# Patient Record
Sex: FEMALE | Race: WHITE | NOT HISPANIC OR LATINO
[De-identification: names, ages, dates, MRNs, and addresses within clinical notes are randomized per-mention and may not be internally consistent; named-entity substitution may affect disease eponyms.]

---

## 2017-01-05 ENCOUNTER — APPOINTMENT (OUTPATIENT)
Dept: MAMMOGRAPHY | Facility: IMAGING CENTER | Age: 57
End: 2017-01-05

## 2017-01-05 ENCOUNTER — APPOINTMENT (OUTPATIENT)
Dept: ULTRASOUND IMAGING | Facility: IMAGING CENTER | Age: 57
End: 2017-01-05

## 2017-01-05 ENCOUNTER — OUTPATIENT (OUTPATIENT)
Dept: OUTPATIENT SERVICES | Facility: HOSPITAL | Age: 57
LOS: 1 days | End: 2017-01-05
Payer: COMMERCIAL

## 2017-01-05 DIAGNOSIS — N62 HYPERTROPHY OF BREAST: ICD-10-CM

## 2017-01-05 DIAGNOSIS — Z80.3 FAMILY HISTORY OF MALIGNANT NEOPLASM OF BREAST: ICD-10-CM

## 2017-01-05 DIAGNOSIS — Z98.89 OTHER SPECIFIED POSTPROCEDURAL STATES: Chronic | ICD-10-CM

## 2017-01-05 DIAGNOSIS — Z85.42 PERSONAL HISTORY OF MALIGNANT NEOPLASM OF OTHER PARTS OF UTERUS: ICD-10-CM

## 2017-01-05 PROCEDURE — 76641 ULTRASOUND BREAST COMPLETE: CPT

## 2017-01-05 PROCEDURE — G0279: CPT

## 2017-01-05 PROCEDURE — 77066 DX MAMMO INCL CAD BI: CPT

## 2017-03-10 ENCOUNTER — TRANSCRIPTION ENCOUNTER (OUTPATIENT)
Age: 57
End: 2017-03-10

## 2017-07-14 ENCOUNTER — APPOINTMENT (OUTPATIENT)
Dept: ULTRASOUND IMAGING | Facility: IMAGING CENTER | Age: 57
End: 2017-07-14

## 2017-07-14 ENCOUNTER — APPOINTMENT (OUTPATIENT)
Dept: MAMMOGRAPHY | Facility: IMAGING CENTER | Age: 57
End: 2017-07-14

## 2017-07-14 ENCOUNTER — OUTPATIENT (OUTPATIENT)
Dept: OUTPATIENT SERVICES | Facility: HOSPITAL | Age: 57
LOS: 1 days | End: 2017-07-14
Payer: COMMERCIAL

## 2017-07-14 DIAGNOSIS — Z00.8 ENCOUNTER FOR OTHER GENERAL EXAMINATION: ICD-10-CM

## 2017-07-14 DIAGNOSIS — Z98.89 OTHER SPECIFIED POSTPROCEDURAL STATES: Chronic | ICD-10-CM

## 2017-07-14 PROCEDURE — G0279: CPT

## 2017-07-14 PROCEDURE — 77065 DX MAMMO INCL CAD UNI: CPT

## 2017-10-25 ENCOUNTER — APPOINTMENT (OUTPATIENT)
Dept: CT IMAGING | Facility: CLINIC | Age: 57
End: 2017-10-25
Payer: COMMERCIAL

## 2017-10-25 ENCOUNTER — OUTPATIENT (OUTPATIENT)
Dept: OUTPATIENT SERVICES | Facility: HOSPITAL | Age: 57
LOS: 1 days | End: 2017-10-25
Payer: COMMERCIAL

## 2017-10-25 DIAGNOSIS — N92.0 EXCESSIVE AND FREQUENT MENSTRUATION WITH REGULAR CYCLE: ICD-10-CM

## 2017-10-25 DIAGNOSIS — N20.0 CALCULUS OF KIDNEY: ICD-10-CM

## 2017-10-25 DIAGNOSIS — Z98.89 OTHER SPECIFIED POSTPROCEDURAL STATES: Chronic | ICD-10-CM

## 2017-10-25 PROCEDURE — 74176 CT ABD & PELVIS W/O CONTRAST: CPT

## 2017-10-25 PROCEDURE — 74176 CT ABD & PELVIS W/O CONTRAST: CPT | Mod: 26

## 2017-11-18 ENCOUNTER — EMERGENCY (EMERGENCY)
Facility: HOSPITAL | Age: 57
LOS: 1 days | Discharge: HOME | End: 2017-11-18
Attending: EMERGENCY MEDICINE | Admitting: EMERGENCY MEDICINE
Payer: COMMERCIAL

## 2017-11-18 VITALS
DIASTOLIC BLOOD PRESSURE: 72 MMHG | HEART RATE: 85 BPM | SYSTOLIC BLOOD PRESSURE: 118 MMHG | RESPIRATION RATE: 18 BRPM | OXYGEN SATURATION: 96 % | TEMPERATURE: 98 F

## 2017-11-18 DIAGNOSIS — Z98.89 OTHER SPECIFIED POSTPROCEDURAL STATES: Chronic | ICD-10-CM

## 2017-11-18 LAB
ALBUMIN SERPL ELPH-MCNC: 4.3 G/DL — SIGNIFICANT CHANGE UP (ref 3.3–5)
ALP SERPL-CCNC: 59 U/L — SIGNIFICANT CHANGE UP (ref 40–120)
ALT FLD-CCNC: 17 U/L RC — SIGNIFICANT CHANGE UP (ref 10–45)
ANION GAP SERPL CALC-SCNC: 12 MMOL/L — SIGNIFICANT CHANGE UP (ref 5–17)
AST SERPL-CCNC: 18 U/L — SIGNIFICANT CHANGE UP (ref 10–40)
BASE EXCESS BLDV CALC-SCNC: 4.4 MMOL/L — HIGH (ref -2–2)
BASOPHILS # BLD AUTO: 0.1 K/UL — SIGNIFICANT CHANGE UP (ref 0–0.2)
BASOPHILS NFR BLD AUTO: 1.3 % — SIGNIFICANT CHANGE UP (ref 0–2)
BILIRUB SERPL-MCNC: 0.3 MG/DL — SIGNIFICANT CHANGE UP (ref 0.2–1.2)
BUN SERPL-MCNC: 17 MG/DL — SIGNIFICANT CHANGE UP (ref 7–23)
CA-I SERPL-SCNC: 1.29 MMOL/L — SIGNIFICANT CHANGE UP (ref 1.12–1.3)
CALCIUM SERPL-MCNC: 9.6 MG/DL — SIGNIFICANT CHANGE UP (ref 8.4–10.5)
CHLORIDE BLDV-SCNC: 102 MMOL/L — SIGNIFICANT CHANGE UP (ref 96–108)
CHLORIDE SERPL-SCNC: 102 MMOL/L — SIGNIFICANT CHANGE UP (ref 96–108)
CO2 BLDV-SCNC: 33 MMOL/L — HIGH (ref 22–30)
CO2 SERPL-SCNC: 27 MMOL/L — SIGNIFICANT CHANGE UP (ref 22–31)
CREAT SERPL-MCNC: 0.64 MG/DL — SIGNIFICANT CHANGE UP (ref 0.5–1.3)
EOSINOPHIL # BLD AUTO: 0.4 K/UL — SIGNIFICANT CHANGE UP (ref 0–0.5)
EOSINOPHIL NFR BLD AUTO: 4.4 % — SIGNIFICANT CHANGE UP (ref 0–6)
GAS PNL BLDV: 141 MMOL/L — SIGNIFICANT CHANGE UP (ref 136–145)
GAS PNL BLDV: SIGNIFICANT CHANGE UP
GAS PNL BLDV: SIGNIFICANT CHANGE UP
GLUCOSE BLDV-MCNC: 113 MG/DL — HIGH (ref 70–99)
GLUCOSE SERPL-MCNC: 111 MG/DL — HIGH (ref 70–99)
HCO3 BLDV-SCNC: 31 MMOL/L — HIGH (ref 21–29)
HCT VFR BLD CALC: 40.9 % — SIGNIFICANT CHANGE UP (ref 34.5–45)
HCT VFR BLDA CALC: 42 % — SIGNIFICANT CHANGE UP (ref 39–50)
HGB BLD CALC-MCNC: 13.6 G/DL — SIGNIFICANT CHANGE UP (ref 11.5–15.5)
HGB BLD-MCNC: 13.6 G/DL — SIGNIFICANT CHANGE UP (ref 11.5–15.5)
LACTATE BLDV-MCNC: 0.7 MMOL/L — SIGNIFICANT CHANGE UP (ref 0.7–2)
LYMPHOCYTES # BLD AUTO: 2.8 K/UL — SIGNIFICANT CHANGE UP (ref 1–3.3)
LYMPHOCYTES # BLD AUTO: 31.8 % — SIGNIFICANT CHANGE UP (ref 13–44)
MCHC RBC-ENTMCNC: 29.9 PG — SIGNIFICANT CHANGE UP (ref 27–34)
MCHC RBC-ENTMCNC: 33.4 GM/DL — SIGNIFICANT CHANGE UP (ref 32–36)
MCV RBC AUTO: 89.4 FL — SIGNIFICANT CHANGE UP (ref 80–100)
MONOCYTES # BLD AUTO: 0.9 K/UL — SIGNIFICANT CHANGE UP (ref 0–0.9)
MONOCYTES NFR BLD AUTO: 10.6 % — SIGNIFICANT CHANGE UP (ref 2–14)
NEUTROPHILS # BLD AUTO: 4.6 K/UL — SIGNIFICANT CHANGE UP (ref 1.8–7.4)
NEUTROPHILS NFR BLD AUTO: 51.9 % — SIGNIFICANT CHANGE UP (ref 43–77)
OTHER CELLS CSF MANUAL: 4 ML/DL — LOW (ref 18–22)
PCO2 BLDV: 57 MMHG — HIGH (ref 35–50)
PH BLDV: 7.36 — SIGNIFICANT CHANGE UP (ref 7.35–7.45)
PLATELET # BLD AUTO: 285 K/UL — SIGNIFICANT CHANGE UP (ref 150–400)
PO2 BLDV: 20 MMHG — LOW (ref 25–45)
POTASSIUM BLDV-SCNC: 3.9 MMOL/L — SIGNIFICANT CHANGE UP (ref 3.5–5)
POTASSIUM SERPL-MCNC: 4.3 MMOL/L — SIGNIFICANT CHANGE UP (ref 3.5–5.3)
POTASSIUM SERPL-SCNC: 4.3 MMOL/L — SIGNIFICANT CHANGE UP (ref 3.5–5.3)
PROT SERPL-MCNC: 6.8 G/DL — SIGNIFICANT CHANGE UP (ref 6–8.3)
RBC # BLD: 4.57 M/UL — SIGNIFICANT CHANGE UP (ref 3.8–5.2)
RBC # FLD: 11.6 % — SIGNIFICANT CHANGE UP (ref 10.3–14.5)
SAO2 % BLDV: 23 % — LOW (ref 67–88)
SODIUM SERPL-SCNC: 141 MMOL/L — SIGNIFICANT CHANGE UP (ref 135–145)
WBC # BLD: 8.9 K/UL — SIGNIFICANT CHANGE UP (ref 3.8–10.5)
WBC # FLD AUTO: 8.9 K/UL — SIGNIFICANT CHANGE UP (ref 3.8–10.5)

## 2017-11-18 PROCEDURE — 99285 EMERGENCY DEPT VISIT HI MDM: CPT

## 2017-11-18 RX ADMIN — Medication 300 MILLIGRAM(S): at 20:16

## 2017-11-18 NOTE — ED PROVIDER NOTE - PHYSICAL EXAMINATION
Dr. Morales:   Gen: AAOX3, in no acute distress, speaking in complete sentences, cooperative with examination  HEENT: PERRL, EOMI, moist oral mucosa, patent airway, normal pharynx, supple neck  Heart: RRR, no murmurs or gallops  Lungs: CTAX2, symmetric chest expansion  Abd: BS+, soft and depressible, nontender to palpation  Ext: distal right lower extremity erythema in medial aspect, no crepitance, no cyanosis  Skin: capillary refill <2 secs  Neuro: no gross deficits

## 2017-11-18 NOTE — ED ADULT NURSE REASSESSMENT NOTE - NS ED NURSE REASSESS COMMENT FT1
1900: report received from sekou monique. pt resting comfortably in bed. pending ultrasound. pt medicated as per md major. pt states "I have a burning pain, it has been going on for three days". redness noted to right calf, warm to touch. pt ambulates with steady gait. no motor or sensory loss noted. pedal pulses strong and equal bilaterally. safety maintained. family at bedside. will continue to monitor.

## 2017-11-18 NOTE — ED PROVIDER NOTE - PROGRESS NOTE DETAILS
Patient was evaluated by me, found in no acute distress, calm, speaking in complete sentences. Physical exam was remarkable for mild right medial distal leg erythema consistent with cellulitis. Patient with stable vital signs and no fever. Doppler study ordered to assess for DVT. No crepitance appreciated on physical exam. Will discharge home with oral abx if ultrasound is normal. I agree with resident assessment and plan. Dr. Keegan Morales Tristan Kitchen DO: pt signed out to me to fu duplex/ duplex neg. Abx sent to pharmacy for cellulitis. fu pmd and return precautions.

## 2017-11-18 NOTE — ED ADULT NURSE NOTE - CHPI ED SYMPTOMS NEG
no difficulty bearing weight/no fever/no weakness/no numbness/no back pain/no deformity/no abrasion/no stiffness/no bruising/no tingling

## 2017-11-18 NOTE — ED PROVIDER NOTE - OBJECTIVE STATEMENT
56F with pmhx of HTN. HLD, DMII presenting with right lower extremity pain for 3 days. Pt reports noticing redness, tenderness and swelling near the ankle area of her right lower extremity. Progressively worsened, and became indurated, prompting her to go to an urgent care center this morning. Pt was informed the uri care did not have a doppler and was referred to the ER. She denies any associated fevers/chills, no preceding trauma or irritation/lesion in the area , denies any new soaps/detergents/lotions. Pt did not take anything for the pain or apply anything to the region. No h/o blood clots in the past. No CP, palpitations, no SOB, no pleuritic pain, no SARAH.

## 2017-11-18 NOTE — ED ADULT NURSE NOTE - OBJECTIVE STATEMENT
56 yr old female with a h/o dm that is controlled on medication , present to the ER for redness , pain and swelling to right lower leg. Pt denies any recent injury and state she noticed symptoms on Thursday. Pt reports pain level of 9/10 on pain scale and burning in quality. Pt state she took Aspirin for pain control that did not provide any relief. Pt has full ROM in all extremities.

## 2017-11-19 VITALS
HEART RATE: 74 BPM | DIASTOLIC BLOOD PRESSURE: 62 MMHG | TEMPERATURE: 98 F | RESPIRATION RATE: 18 BRPM | OXYGEN SATURATION: 95 % | SYSTOLIC BLOOD PRESSURE: 109 MMHG

## 2017-11-19 PROCEDURE — 82435 ASSAY OF BLOOD CHLORIDE: CPT

## 2017-11-19 PROCEDURE — 82947 ASSAY GLUCOSE BLOOD QUANT: CPT

## 2017-11-19 PROCEDURE — 84295 ASSAY OF SERUM SODIUM: CPT

## 2017-11-19 PROCEDURE — 85014 HEMATOCRIT: CPT

## 2017-11-19 PROCEDURE — 93971 EXTREMITY STUDY: CPT

## 2017-11-19 PROCEDURE — 93971 EXTREMITY STUDY: CPT | Mod: 26

## 2017-11-19 PROCEDURE — 83605 ASSAY OF LACTIC ACID: CPT

## 2017-11-19 PROCEDURE — 84132 ASSAY OF SERUM POTASSIUM: CPT

## 2017-11-19 PROCEDURE — 82803 BLOOD GASES ANY COMBINATION: CPT

## 2017-11-19 PROCEDURE — 85027 COMPLETE CBC AUTOMATED: CPT

## 2017-11-19 PROCEDURE — 99284 EMERGENCY DEPT VISIT MOD MDM: CPT | Mod: 25

## 2017-11-19 PROCEDURE — 80053 COMPREHEN METABOLIC PANEL: CPT

## 2017-11-19 PROCEDURE — 82330 ASSAY OF CALCIUM: CPT

## 2017-11-19 RX ADMIN — Medication 300 MILLIGRAM(S): at 02:45

## 2017-11-19 NOTE — ED ADULT NURSE REASSESSMENT NOTE - NS ED NURSE REASSESS COMMENT FT1
pt resting comfortably in bed. pt denies chest pain/sob/fever/chills/n/v/diaphoresis at this time. vital signs stable. pending ultrasound. safety maintained. family at bedside. will continue to monitor.

## 2017-12-07 ENCOUNTER — APPOINTMENT (OUTPATIENT)
Age: 57
End: 2017-12-07
Payer: COMMERCIAL

## 2017-12-07 VITALS
RESPIRATION RATE: 16 BRPM | WEIGHT: 252 LBS | TEMPERATURE: 98.1 F | BODY MASS INDEX: 43.02 KG/M2 | DIASTOLIC BLOOD PRESSURE: 74 MMHG | SYSTOLIC BLOOD PRESSURE: 113 MMHG | HEART RATE: 76 BPM | HEIGHT: 64 IN

## 2017-12-07 PROCEDURE — 99204 OFFICE O/P NEW MOD 45 MIN: CPT

## 2017-12-07 RX ORDER — AZELASTINE HYDROCHLORIDE 205.5 UG/1
0.15 SPRAY, METERED NASAL
Qty: 30 | Refills: 0 | Status: DISCONTINUED | COMMUNITY
Start: 2017-08-24 | End: 2017-12-07

## 2017-12-07 RX ORDER — MELOXICAM 7.5 MG/1
7.5 TABLET ORAL
Qty: 30 | Refills: 0 | Status: DISCONTINUED | COMMUNITY
Start: 2017-05-30 | End: 2017-12-07

## 2017-12-07 RX ORDER — OLOPATADINE HYDROCHLORIDE 2 MG/ML
0.2 SOLUTION OPHTHALMIC
Qty: 2 | Refills: 0 | Status: DISCONTINUED | COMMUNITY
Start: 2017-08-24 | End: 2017-12-07

## 2017-12-07 RX ORDER — CIPROFLOXACIN AND DEXAMETHASONE 3; 1 MG/ML; MG/ML
0.3-0.1 SUSPENSION/ DROPS AURICULAR (OTIC)
Qty: 8 | Refills: 0 | Status: DISCONTINUED | COMMUNITY
Start: 2017-03-10 | End: 2017-12-07

## 2017-12-07 RX ORDER — METOCLOPRAMIDE 5 MG/1
5 TABLET ORAL
Qty: 90 | Refills: 0 | Status: DISCONTINUED | COMMUNITY
Start: 2017-08-01 | End: 2017-12-07

## 2017-12-07 RX ORDER — FUROSEMIDE 40 MG/1
40 TABLET ORAL
Qty: 30 | Refills: 0 | Status: DISCONTINUED | COMMUNITY
Start: 2017-04-14 | End: 2017-12-07

## 2017-12-08 LAB
A1AT SERPL-MCNC: 143 MG/DL
ACE BLD-CCNC: 61 U/L
AFP-TM SERPL-MCNC: 5.1 NG/ML
ALBUMIN SERPL ELPH-MCNC: 4.3 G/DL
ALP BLD-CCNC: 62 U/L
ALT SERPL-CCNC: 13 U/L
ANION GAP SERPL CALC-SCNC: 11 MMOL/L
AST SERPL-CCNC: 19 U/L
BASOPHILS # BLD AUTO: 0.05 K/UL
BASOPHILS NFR BLD AUTO: 0.7 %
BILIRUB SERPL-MCNC: 0.4 MG/DL
BUN SERPL-MCNC: 16 MG/DL
CALCIUM SERPL-MCNC: 9.8 MG/DL
CHLORIDE SERPL-SCNC: 102 MMOL/L
CO2 SERPL-SCNC: 26 MMOL/L
CREAT SERPL-MCNC: 0.66 MG/DL
EOSINOPHIL # BLD AUTO: 0.23 K/UL
EOSINOPHIL NFR BLD AUTO: 3.4 %
HBV SURFACE AB SER QL: NONREACTIVE
HBV SURFACE AG SER QL: NONREACTIVE
HCT VFR BLD CALC: 40.8 %
HCV AB SER QL: NONREACTIVE
HCV S/CO RATIO: 0.09 S/CO
HGB BLD-MCNC: 12.9 G/DL
IGA SER QL IEP: 44 MG/DL
IGG SER QL IEP: 483 MG/DL
IGM SER QL IEP: 16 MG/DL
IMM GRANULOCYTES NFR BLD AUTO: 0.1 %
INR PPP: 0.97 RATIO
LYMPHOCYTES # BLD AUTO: 1.76 K/UL
LYMPHOCYTES NFR BLD AUTO: 25.7 %
MAN DIFF?: NORMAL
MCHC RBC-ENTMCNC: 27.9 PG
MCHC RBC-ENTMCNC: 31.6 GM/DL
MCV RBC AUTO: 88.1 FL
MITOCHONDRIA AB SER IF-ACNC: NORMAL
MONOCYTES # BLD AUTO: 0.77 K/UL
MONOCYTES NFR BLD AUTO: 11.2 %
NEUTROPHILS # BLD AUTO: 4.03 K/UL
NEUTROPHILS NFR BLD AUTO: 58.9 %
PLATELET # BLD AUTO: 323 K/UL
POTASSIUM SERPL-SCNC: 4.2 MMOL/L
PROT SERPL-MCNC: 7.1 G/DL
PT BLD: 10.9 SEC
RBC # BLD: 4.63 M/UL
RBC # FLD: 13.4 %
SMOOTH MUSCLE AB SER QL IF: NORMAL
SODIUM SERPL-SCNC: 139 MMOL/L
WBC # FLD AUTO: 6.85 K/UL

## 2017-12-11 LAB
ANA PAT FLD IF-IMP: NORMAL
ANA SER IF-ACNC: ABNORMAL
ENA SS-A AB SER IA-ACNC: <0.2 AL
ENA SS-B AB SER IA-ACNC: <0.2 AL
HAV IGG+IGM SER QL: NONREACTIVE
LKM AB SER QL IF: 0.3 UNITS
TTG IGA SER IA-ACNC: <5 UNITS
TTG IGA SER-ACNC: NEGATIVE
TTG IGG SER IA-ACNC: <5 UNITS
TTG IGG SER IA-ACNC: NEGATIVE

## 2017-12-13 LAB
A1AT PHENOTYP SERPL-IMP: NORMAL BANDS
A1AT SERPL-MCNC: 137 MG/DL
HDV AB SER IA-ACNC: NEGATIVE
HEV AB SER QL: NEGATIVE

## 2017-12-18 LAB — SOLUBLE LIVER IGG SER IA-ACNC: < 20.1 UNITS

## 2017-12-27 ENCOUNTER — APPOINTMENT (OUTPATIENT)
Age: 57
End: 2017-12-27
Payer: COMMERCIAL

## 2017-12-27 PROCEDURE — 91200 LIVER ELASTOGRAPHY: CPT

## 2018-02-07 ENCOUNTER — APPOINTMENT (OUTPATIENT)
Age: 58
End: 2018-02-07
Payer: COMMERCIAL

## 2018-02-07 VITALS
HEART RATE: 96 BPM | WEIGHT: 256 LBS | RESPIRATION RATE: 14 BRPM | HEIGHT: 64 IN | BODY MASS INDEX: 43.71 KG/M2 | TEMPERATURE: 98.5 F | DIASTOLIC BLOOD PRESSURE: 82 MMHG | SYSTOLIC BLOOD PRESSURE: 118 MMHG

## 2018-02-07 PROCEDURE — 99214 OFFICE O/P EST MOD 30 MIN: CPT

## 2018-02-13 ENCOUNTER — APPOINTMENT (OUTPATIENT)
Dept: ULTRASOUND IMAGING | Facility: IMAGING CENTER | Age: 58
End: 2018-02-13
Payer: COMMERCIAL

## 2018-02-13 ENCOUNTER — APPOINTMENT (OUTPATIENT)
Dept: MAMMOGRAPHY | Facility: IMAGING CENTER | Age: 58
End: 2018-02-13
Payer: COMMERCIAL

## 2018-02-13 ENCOUNTER — OUTPATIENT (OUTPATIENT)
Dept: OUTPATIENT SERVICES | Facility: HOSPITAL | Age: 58
LOS: 1 days | End: 2018-02-13
Payer: COMMERCIAL

## 2018-02-13 DIAGNOSIS — Z98.89 OTHER SPECIFIED POSTPROCEDURAL STATES: Chronic | ICD-10-CM

## 2018-02-13 DIAGNOSIS — Z00.8 ENCOUNTER FOR OTHER GENERAL EXAMINATION: ICD-10-CM

## 2018-02-13 PROCEDURE — 76641 ULTRASOUND BREAST COMPLETE: CPT | Mod: 26,50

## 2018-02-13 PROCEDURE — 77067 SCR MAMMO BI INCL CAD: CPT

## 2018-02-13 PROCEDURE — 77063 BREAST TOMOSYNTHESIS BI: CPT

## 2018-02-13 PROCEDURE — 76641 ULTRASOUND BREAST COMPLETE: CPT

## 2018-02-13 PROCEDURE — 77063 BREAST TOMOSYNTHESIS BI: CPT | Mod: 26

## 2018-02-13 PROCEDURE — 77067 SCR MAMMO BI INCL CAD: CPT | Mod: 26

## 2018-03-05 ENCOUNTER — APPOINTMENT (OUTPATIENT)
Age: 58
End: 2018-03-05
Payer: COMMERCIAL

## 2018-03-05 PROCEDURE — 90746 HEPB VACCINE 3 DOSE ADULT IM: CPT

## 2018-03-05 PROCEDURE — 90471 IMMUNIZATION ADMIN: CPT

## 2018-05-01 ENCOUNTER — APPOINTMENT (OUTPATIENT)
Dept: GASTROENTEROLOGY | Facility: CLINIC | Age: 58
End: 2018-05-01

## 2018-07-05 ENCOUNTER — OTHER (OUTPATIENT)
Age: 58
End: 2018-07-05

## 2018-07-06 ENCOUNTER — APPOINTMENT (OUTPATIENT)
Dept: PULMONOLOGY | Facility: CLINIC | Age: 58
End: 2018-07-06
Payer: COMMERCIAL

## 2018-07-06 VITALS
DIASTOLIC BLOOD PRESSURE: 83 MMHG | RESPIRATION RATE: 16 BRPM | HEART RATE: 65 BPM | OXYGEN SATURATION: 96 % | SYSTOLIC BLOOD PRESSURE: 122 MMHG

## 2018-07-06 DIAGNOSIS — J98.01 ACUTE BRONCHOSPASM: ICD-10-CM

## 2018-07-06 PROCEDURE — 94727 GAS DIL/WSHOT DETER LNG VOL: CPT

## 2018-07-06 PROCEDURE — 94250: CPT

## 2018-07-06 PROCEDURE — 99214 OFFICE O/P EST MOD 30 MIN: CPT | Mod: 25

## 2018-07-06 PROCEDURE — 71046 X-RAY EXAM CHEST 2 VIEWS: CPT

## 2018-07-06 PROCEDURE — 94640 AIRWAY INHALATION TREATMENT: CPT

## 2018-07-06 RX ORDER — HYDROCHLOROTHIAZIDE 12.5 MG/1
12.5 TABLET ORAL
Qty: 180 | Refills: 0 | Status: DISCONTINUED | COMMUNITY
Start: 2018-03-01

## 2018-07-06 RX ORDER — HYDROCODONE BITARTRATE AND ACETAMINOPHEN 7.5; 325 MG/1; MG/1
7.5-325 TABLET ORAL
Qty: 20 | Refills: 0 | Status: DISCONTINUED | COMMUNITY
Start: 2018-01-18

## 2018-07-06 RX ORDER — LIFITEGRAST 50 MG/ML
5 SOLUTION/ DROPS OPHTHALMIC
Qty: 60 | Refills: 0 | Status: DISCONTINUED | COMMUNITY
Start: 2017-03-24 | End: 2018-07-06

## 2018-07-06 RX ORDER — EPINASTINE HYDROCHLORIDE 0.5 MG/ML
0.05 SOLUTION/ DROPS OPHTHALMIC
Qty: 20 | Refills: 0 | Status: DISCONTINUED | COMMUNITY
Start: 2018-06-25

## 2018-07-06 RX ORDER — POTASSIUM CITRATE 10 MEQ/1
10 MEQ TABLET, EXTENDED RELEASE ORAL
Qty: 180 | Refills: 0 | Status: DISCONTINUED | COMMUNITY
Start: 2018-01-25

## 2018-07-06 RX ORDER — HYDROCODONE BITARTRATE AND HOMATROPINE METHYLBROMIDE 5; 1.5 MG/1; MG/1
5-1.5 TABLET ORAL
Qty: 100 | Refills: 0 | Status: DISCONTINUED | COMMUNITY
Start: 2018-03-07

## 2018-07-06 RX ORDER — ERYTHROMYCIN 5 MG/G
5 OINTMENT OPHTHALMIC
Qty: 10 | Refills: 0 | Status: DISCONTINUED | COMMUNITY
Start: 2018-06-25

## 2018-07-06 RX ORDER — HYDROCORTISONE 25 MG/G
2.5 CREAM TOPICAL
Qty: 30 | Refills: 0 | Status: DISCONTINUED | COMMUNITY
Start: 2017-05-15 | End: 2018-07-06

## 2018-07-06 RX ORDER — LEVOFLOXACIN 500 MG/1
500 TABLET, FILM COATED ORAL
Qty: 10 | Refills: 0 | Status: DISCONTINUED | COMMUNITY
Start: 2018-03-27

## 2018-07-16 ENCOUNTER — FORM ENCOUNTER (OUTPATIENT)
Age: 58
End: 2018-07-16

## 2018-07-17 ENCOUNTER — APPOINTMENT (OUTPATIENT)
Dept: CT IMAGING | Facility: IMAGING CENTER | Age: 58
End: 2018-07-17
Payer: COMMERCIAL

## 2018-07-17 ENCOUNTER — APPOINTMENT (OUTPATIENT)
Dept: RADIOLOGY | Facility: IMAGING CENTER | Age: 58
End: 2018-07-17
Payer: COMMERCIAL

## 2018-07-17 ENCOUNTER — OUTPATIENT (OUTPATIENT)
Dept: OUTPATIENT SERVICES | Facility: HOSPITAL | Age: 58
LOS: 1 days | End: 2018-07-17
Payer: COMMERCIAL

## 2018-07-17 DIAGNOSIS — Z00.8 ENCOUNTER FOR OTHER GENERAL EXAMINATION: ICD-10-CM

## 2018-07-17 DIAGNOSIS — Z98.89 OTHER SPECIFIED POSTPROCEDURAL STATES: Chronic | ICD-10-CM

## 2018-07-17 PROCEDURE — 71250 CT THORAX DX C-: CPT

## 2018-07-17 PROCEDURE — 71250 CT THORAX DX C-: CPT | Mod: 26

## 2018-07-17 PROCEDURE — 71046 X-RAY EXAM CHEST 2 VIEWS: CPT | Mod: 26

## 2018-07-17 PROCEDURE — 71046 X-RAY EXAM CHEST 2 VIEWS: CPT

## 2018-07-20 ENCOUNTER — RECORD ABSTRACTING (OUTPATIENT)
Age: 58
End: 2018-07-20

## 2018-08-08 ENCOUNTER — APPOINTMENT (OUTPATIENT)
Dept: HEPATOLOGY | Facility: CLINIC | Age: 58
End: 2018-08-08
Payer: COMMERCIAL

## 2018-08-08 VITALS
HEIGHT: 64 IN | DIASTOLIC BLOOD PRESSURE: 87 MMHG | TEMPERATURE: 98.3 F | WEIGHT: 252 LBS | RESPIRATION RATE: 13 BRPM | SYSTOLIC BLOOD PRESSURE: 136 MMHG | OXYGEN SATURATION: 96 % | BODY MASS INDEX: 43.02 KG/M2 | HEART RATE: 81 BPM

## 2018-08-08 PROCEDURE — ZZZZZ: CPT

## 2018-08-08 PROCEDURE — 99214 OFFICE O/P EST MOD 30 MIN: CPT | Mod: 25

## 2018-08-08 PROCEDURE — 90471 IMMUNIZATION ADMIN: CPT

## 2018-08-08 PROCEDURE — 90636 HEP A/HEP B VACC ADULT IM: CPT

## 2018-08-30 ENCOUNTER — APPOINTMENT (OUTPATIENT)
Dept: PULMONOLOGY | Facility: CLINIC | Age: 58
End: 2018-08-30

## 2018-09-14 ENCOUNTER — APPOINTMENT (OUTPATIENT)
Dept: GASTROENTEROLOGY | Facility: CLINIC | Age: 58
End: 2018-09-14

## 2019-01-14 ENCOUNTER — MEDICATION RENEWAL (OUTPATIENT)
Age: 59
End: 2019-01-14

## 2019-01-18 ENCOUNTER — APPOINTMENT (OUTPATIENT)
Dept: HEPATOLOGY | Facility: HOSPITAL | Age: 59
End: 2019-01-18

## 2019-02-07 DIAGNOSIS — Z00.00 ENCOUNTER FOR GENERAL ADULT MEDICAL EXAMINATION W/OUT ABNORMAL FINDINGS: ICD-10-CM

## 2019-02-11 LAB
HBV SURFACE AB SER QL: REACTIVE
HBV SURFACE AB SERPL IA-ACNC: 669 MIU/ML

## 2019-02-14 ENCOUNTER — APPOINTMENT (OUTPATIENT)
Dept: MAMMOGRAPHY | Facility: IMAGING CENTER | Age: 59
End: 2019-02-14
Payer: COMMERCIAL

## 2019-02-14 ENCOUNTER — OUTPATIENT (OUTPATIENT)
Dept: OUTPATIENT SERVICES | Facility: HOSPITAL | Age: 59
LOS: 1 days | End: 2019-02-14
Payer: COMMERCIAL

## 2019-02-14 ENCOUNTER — APPOINTMENT (OUTPATIENT)
Dept: ULTRASOUND IMAGING | Facility: IMAGING CENTER | Age: 59
End: 2019-02-14
Payer: COMMERCIAL

## 2019-02-14 ENCOUNTER — APPOINTMENT (OUTPATIENT)
Dept: HEPATOLOGY | Facility: CLINIC | Age: 59
End: 2019-02-14
Payer: COMMERCIAL

## 2019-02-14 ENCOUNTER — APPOINTMENT (OUTPATIENT)
Dept: HEPATOLOGY | Facility: CLINIC | Age: 59
End: 2019-02-14

## 2019-02-14 VITALS
SYSTOLIC BLOOD PRESSURE: 143 MMHG | WEIGHT: 260 LBS | BODY MASS INDEX: 44.39 KG/M2 | DIASTOLIC BLOOD PRESSURE: 99 MMHG | RESPIRATION RATE: 16 BRPM | HEIGHT: 64 IN | HEART RATE: 74 BPM | TEMPERATURE: 98.3 F

## 2019-02-14 DIAGNOSIS — Z98.89 OTHER SPECIFIED POSTPROCEDURAL STATES: Chronic | ICD-10-CM

## 2019-02-14 DIAGNOSIS — Z00.8 ENCOUNTER FOR OTHER GENERAL EXAMINATION: ICD-10-CM

## 2019-02-14 PROCEDURE — 77067 SCR MAMMO BI INCL CAD: CPT | Mod: 26

## 2019-02-14 PROCEDURE — 77063 BREAST TOMOSYNTHESIS BI: CPT

## 2019-02-14 PROCEDURE — 76641 ULTRASOUND BREAST COMPLETE: CPT | Mod: 26,50

## 2019-02-14 PROCEDURE — 77067 SCR MAMMO BI INCL CAD: CPT

## 2019-02-14 PROCEDURE — 77063 BREAST TOMOSYNTHESIS BI: CPT | Mod: 26

## 2019-02-14 PROCEDURE — 99214 OFFICE O/P EST MOD 30 MIN: CPT

## 2019-02-14 PROCEDURE — 76641 ULTRASOUND BREAST COMPLETE: CPT

## 2019-02-22 ENCOUNTER — APPOINTMENT (OUTPATIENT)
Dept: HEPATOLOGY | Facility: HOSPITAL | Age: 59
End: 2019-02-22

## 2019-05-29 ENCOUNTER — APPOINTMENT (OUTPATIENT)
Dept: RHEUMATOLOGY | Facility: CLINIC | Age: 59
End: 2019-05-29

## 2019-06-19 ENCOUNTER — APPOINTMENT (OUTPATIENT)
Dept: ENDOCRINOLOGY | Facility: CLINIC | Age: 59
End: 2019-06-19
Payer: COMMERCIAL

## 2019-06-19 VITALS
BODY MASS INDEX: 45.41 KG/M2 | SYSTOLIC BLOOD PRESSURE: 124 MMHG | DIASTOLIC BLOOD PRESSURE: 82 MMHG | OXYGEN SATURATION: 97 % | HEART RATE: 81 BPM | WEIGHT: 266 LBS | HEIGHT: 64 IN

## 2019-06-19 PROCEDURE — 99205 OFFICE O/P NEW HI 60 MIN: CPT

## 2019-06-19 PROCEDURE — 82962 GLUCOSE BLOOD TEST: CPT

## 2019-06-19 PROCEDURE — 83036 HEMOGLOBIN GLYCOSYLATED A1C: CPT | Mod: QW

## 2019-06-19 RX ORDER — CLINDAMYCIN HYDROCHLORIDE 150 MG/1
150 CAPSULE ORAL
Qty: 24 | Refills: 0 | Status: DISCONTINUED | COMMUNITY
Start: 2018-07-03 | End: 2019-06-19

## 2019-06-19 RX ORDER — FLUTICASONE FUROATE AND VILANTEROL TRIFENATATE 100; 25 UG/1; UG/1
100-25 POWDER RESPIRATORY (INHALATION) DAILY
Qty: 3 | Refills: 0 | Status: DISCONTINUED | COMMUNITY
Start: 2018-07-06 | End: 2019-06-19

## 2019-06-19 RX ORDER — BUDESONIDE AND FORMOTEROL FUMARATE DIHYDRATE 80; 4.5 UG/1; UG/1
80-4.5 AEROSOL RESPIRATORY (INHALATION)
Qty: 10 | Refills: 0 | Status: DISCONTINUED | COMMUNITY
Start: 2018-04-03 | End: 2019-06-19

## 2019-06-19 RX ORDER — OMEPRAZOLE 40 MG/1
40 CAPSULE, DELAYED RELEASE ORAL
Qty: 90 | Refills: 0 | Status: DISCONTINUED | COMMUNITY
Start: 2018-03-14 | End: 2019-06-19

## 2019-06-19 RX ORDER — LOSARTAN POTASSIUM 25 MG/1
25 TABLET, FILM COATED ORAL
Qty: 90 | Refills: 0 | Status: DISCONTINUED | COMMUNITY
Start: 2018-05-31 | End: 2019-06-19

## 2019-06-19 NOTE — PHYSICAL EXAM
[Alert] : alert [No Respiratory Distress] : no respiratory distress [Normal Hearing] : hearing was normal [No Acute Distress] : no acute distress [Normal Rate and Effort] : normal respiratory rhythm and effort [No Accessory Muscle Use] : no accessory muscle use [Normal S1, S2] : normal S1 and S2 [Normal Rate] : heart rate was normal  [Clear to Auscultation] : lungs were clear to auscultation bilaterally [Pedal Pulses Normal] : the pedal pulses are present [Regular Rhythm] : with a regular rhythm [Not Tender] : non-tender [Normal Bowel Sounds] : normal bowel sounds [No Edema] : there was no peripheral edema [No Stigmata of Cushings Syndrome] : no stigmata of cushings syndrome [Soft] : abdomen soft [Normal Gait] : normal gait [Normal Strength/Tone] : muscle strength and tone were normal [No Joint Swelling] : no joint swelling seen [No Rash] : no rash [Left Foot Was Examined] : left foot ~C was examined [Right Foot Was Examined] : right foot ~C was examined [Normal] : normal [Full ROM] : with full range of motion [2+] : 2+ in the dorsalis pedis [No Motor Deficits] : the motor exam was normal [Normal Reflexes] : deep tendon reflexes were 2+ and symmetric [Normal Insight/Judgement] : insight and judgment were intact [No Tremors] : no tremors [Normal Affect] : the affect was normal [Normal Mood] : the mood was normal [Thyroid Not Enlarged] : the thyroid was not enlarged [Foot Ulcers] : no foot ulcers [Tenderness] : not tender [Erythema] : not erythematous [Swelling] : not swollen [#1 Diminished] : number 1 was normal [#2 Diminished] : number 2 was normal [#3 Diminished] : number 3 was normal [#4 Diminished] : number 4 was normal [#5 Diminished] : number 5 was normal [#6 Diminished] : number 6 was normal [#7 Diminished] : number 7 was normal [#8 Diminished] : number 8 was normal [#9 Diminished] : number 9 was normal [#10 Diminished] : number 10 was normal [de-identified] : BMI 45

## 2019-06-19 NOTE — REASON FOR VISIT
[Initial Eval - Existing Diagnosis] : an initial evaluation of an existing diagnosis [FreeTextEntry1] : Obesity, uncontrolled diabetes, HLD

## 2019-06-19 NOTE — ASSESSMENT
[FreeTextEntry1] : Patient is a 57 yo woman with uncontrolled T2DM (POCT A1c 8.7%), obesity, HLD and HTN establishing endocrine care\par \par 1. Uncontrolled Type 2 DM\par -patient is non-adherent to a health diet. Extensive discussion had regarding the importance of behavioral modifications including decreasing carbs and sugars, especially with history of NAFLD.  Micro and macrovascular disease of uncontrolled diabetes discussed\par -she is on high dose lantus which as a side effect is weight gain.  Add trulicity, low dose for now. GI side effects discussed.  No personal or family hx of pancreatitis or medullary thyroid cancer\par -continue the metformin\par -nutrition consult\par -weight loss goal of 6-8 lbs by next visit\par -was told of having sleep apnea but has gone untreated. Strongly recommend sleep study and appropriate treatment\par -patient states she is depressed and not optimized on lexapro.  Recommend consideration of wellbutrin for weight benefit. She has no seizure history. Defer to PCP\par \par 2. Obesity\par -patient with no evidence of Cushing's but with the IVORY, diabetes and obesity, check AM cortisol levels and IGF1 level\par \par 3. HLD\par -statin\par \par 4. HTN\par -BP goal < 140/90\par \par 5. HCM\par -patient requires screening bone density. Hx of CHELSEA-BSO\par -DXA at next visit\par \par Follow up in 3 months with MD, next available with nutritionist [Carbohydrate Consistent Diet] : carbohydrate consistent diet [Diabetes Foot Care] : diabetes foot care [Long Term Vascular Complications] : long term vascular complications of diabetes [Importance of Diet and Exercise] : importance of diet and exercise to improve glycemic control, achieve weight loss and improve cardiovascular health [Self Monitoring of Blood Glucose] : self monitoring of blood glucose [Action and use of Insulin] : action and use of short and long-acting insulin [Injection Technique, Storage, Sharps Disposal] : injection technique, storage, and sharps disposal [Insulin Self-Administration] : insulin self-administration

## 2019-06-19 NOTE — HISTORY OF PRESENT ILLNESS
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  Has had prediabetes [id number].  She then developed her immune deficiency and required rituxan. With the rituxan she was getting decadron.  Then with the decadron she developed diabetes.  Has not had rituxan and has not had steroids since 2017.  Has been on insulin since 2013.  Lantus 60 units daily and metformin 1000 mg BID.  Had previously been on Victoza and developed nausea.  Used to see Dr. Angulo for diabetes about five years.  She was afraid of Victoza\par Does not check sugars\par Breakfast: coffee, 2-3 times a week eggs or rye toast with butter; packet of [employer] Oats\par Lunch: chicken salad on a bun\par Snacks: "that's my downfall."  Pineapple, cereal, chips, egg roll\par Dinner: chicken, broccoli, fish.  Loves Chinese Food-take out, rice, pasta. 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  Had menopause prior to surgery. Had screening bone density early 2000\par \par

## 2019-06-19 NOTE — CONSULT LETTER
[Dear  ___] : Dear  [unfilled], [Please see my note below.] : Please see my note below. [Consult Letter:] : I had the pleasure of evaluating your patient, [unfilled]. [Consult Closing:] : Thank you very much for allowing me to participate in the care of this patient.  If you have any questions, please do not hesitate to contact me. [Sincerely,] : Sincerely, [FreeTextEntry3] : Shirley Huber MD [DrMc  ___] : Dr. MARTINEZ

## 2019-06-19 NOTE — REVIEW OF SYSTEMS
[Fatigue] : fatigue [Stress] : stress [Depression] : depression [All other systems negative] : All other systems negative [Negative] : ENT [Visual Field Defect] : no visual field defect [Blurry Vision] : no blurred vision [Dysphagia] : no dysphagia [Dysphonia] : no dysphonia [Chest Pain] : no chest pain [Palpitations] : no palpitations [Heart Rate Is Slow] : the heart rate was not slow [Heart Rate Is Fast] : the heart rate was not fast [Shortness Of Breath] : no shortness of breath [Wheezing] : no wheezing was heard [Cough] : no cough [SOB on Exertion] : no shortness of breath during exertion [Nausea] : no nausea [Diarrhea] : no diarrhea [Constipation] : no constipation [Vomiting] : no vomiting was observed [Tremors] : no tremors [Headache] : no headaches [Anxiety] : no anxiety [Cold Intolerance] : cold tolerant [Easy Bleeding] : no ~M tendency for easy bleeding [Easy Bruising] : no tendency for easy bruising [Heat Intolerance] : heat tolerant [FreeTextEntry2] : "sluggish"

## 2019-06-20 LAB
GLUCOSE BLDC GLUCOMTR-MCNC: 188
HBA1C MFR BLD HPLC: 8.7

## 2019-06-24 ENCOUNTER — APPOINTMENT (OUTPATIENT)
Dept: PEDIATRIC ALLERGY IMMUNOLOGY | Facility: CLINIC | Age: 59
End: 2019-06-24
Payer: COMMERCIAL

## 2019-06-24 ENCOUNTER — LABORATORY RESULT (OUTPATIENT)
Age: 59
End: 2019-06-24

## 2019-06-24 VITALS
HEIGHT: 64 IN | DIASTOLIC BLOOD PRESSURE: 81 MMHG | OXYGEN SATURATION: 95 % | BODY MASS INDEX: 44.05 KG/M2 | SYSTOLIC BLOOD PRESSURE: 128 MMHG | HEART RATE: 79 BPM | WEIGHT: 258 LBS

## 2019-06-24 DIAGNOSIS — Z01.82 ENCOUNTER FOR ALLERGY TESTING: ICD-10-CM

## 2019-06-24 PROCEDURE — 99244 OFF/OP CNSLTJ NEW/EST MOD 40: CPT | Mod: GC

## 2019-06-24 PROCEDURE — 36415 COLL VENOUS BLD VENIPUNCTURE: CPT | Mod: GC

## 2019-06-25 ENCOUNTER — APPOINTMENT (OUTPATIENT)
Dept: GASTROENTEROLOGY | Facility: CLINIC | Age: 59
End: 2019-06-25
Payer: COMMERCIAL

## 2019-06-25 VITALS
WEIGHT: 263 LBS | OXYGEN SATURATION: 96 % | HEART RATE: 77 BPM | BODY MASS INDEX: 44.9 KG/M2 | TEMPERATURE: 98.5 F | HEIGHT: 64 IN | DIASTOLIC BLOOD PRESSURE: 86 MMHG | RESPIRATION RATE: 16 BRPM | SYSTOLIC BLOOD PRESSURE: 124 MMHG

## 2019-06-25 DIAGNOSIS — R10.84 GENERALIZED ABDOMINAL PAIN: ICD-10-CM

## 2019-06-25 PROCEDURE — 99214 OFFICE O/P EST MOD 30 MIN: CPT

## 2019-06-25 NOTE — REVIEW OF SYSTEMS
[Rhinorrhea] : rhinorrhea [Nasal Congestion] : nasal congestion [Cough] : cough [Abdominal Pain] : abdominal pain [Snoring] : snoring [Nl] : Endocrine

## 2019-06-26 PROBLEM — Z01.82 ENCOUNTER FOR ALLERGY TESTING: Status: ACTIVE | Noted: 2019-06-26

## 2019-06-26 NOTE — CONSULT LETTER
[Dear  ___] : Dear  [unfilled], [Consult Letter:] : I had the pleasure of evaluating your patient, [unfilled]. [Please see my note below.] : Please see my note below. [Consult Closing:] : Thank you very much for allowing me to participate in the care of this patient.  If you have any questions, please do not hesitate to contact me. [Sincerely,] : Sincerely, [FreeTextEntry3] : Liana Johns MD\par Fellow, Division of Allergy/Immunology\par [facility]\par \par Chepe Gresham III  MPH, MD, PhD, FACP, FACAAI, FAAAAI \par , Departments of Medicine and Pediatrics \par [facility] \par , [facility] \par Attending Physician, Division of Allergy & Immunology [facility]\par \par \par

## 2019-06-26 NOTE — REASON FOR VISIT
[Initial Consultation] : an initial consultation for [Cough] : cough [Congestion] : congestion [Runny Nose] : runny nose [Allergy Evaluation/ Skin Testing] : allergy evaluation and or skin testing [FreeTextEntry2] : c

## 2019-06-26 NOTE — PHYSICAL EXAM
[Alert] : alert [Well Nourished] : well nourished [Healthy Appearance] : healthy appearance [No Acute Distress] : no acute distress [Well Developed] : well developed [Normal Pupil & Iris Size/Symmetry] : normal pupil and iris size and symmetry [No Discharge] : no discharge [No Photophobia] : no photophobia [Normal Nasal Mucosa] : the nasal mucosa was normal [Normal TMs] : both tympanic membranes were normal [Sclera Not Icteric] : sclera not icteric [Normal Lips/Tongue] : the lips and tongue were normal [Normal Outer Ear/Nose] : the ears and nose were normal in appearance [No Thrush] : no thrush [Normal Dentition] : normal dentition [No Oral Lesions or Ulcers] : no oral lesions or ulcers [Supple] : the neck was supple [Normal Rate and Effort] : normal respiratory rhythm and effort [No Retractions] : no retractions [No Crackles] : no crackles [Normal Rate] : heart rate was normal  [Bilateral Audible Breath Sounds] : bilateral audible breath sounds [No murmur] : no murmur [Normal S1, S2] : normal S1 and S2 [Regular Rhythm] : with a regular rhythm [Soft] : abdomen soft [Not Tender] : non-tender [Not Distended] : not distended [No HSM] : no hepato-splenomegaly [Normal Cervical Lymph Nodes] : cervical [Skin Intact] : skin intact  [No Rash] : no rash [No Joint Swelling or Erythema] : no joint swelling or erythema [No Skin Lesions] : no skin lesions [No Edema] : no edema [No Cyanosis] : no cyanosis [Normal Mood] : mood was normal [Alert, Awake, Oriented as Age-Appropriate] : alert, awake, oriented as age appropriate [No Nasal Discharge] : no nasal discharge [Posterior Pharyngeal Cobblestoning] : posterior pharyngeal cobblestoning [Cranial Nerves Intact] : cranial nerves 2-12 were intact [Suborbital Bogginess] : no suborbital bogginess (allergic shiners) [Conjunctival Erythema] : no conjunctival erythema [Wheezing] : no wheezing was heard [Boggy Nasal Turbinates] : no boggy and/or pale nasal turbinates [Xerosis] : no xerosis [Eczematous Patches] : no eczematous patches

## 2019-06-26 NOTE — SOCIAL HISTORY
[House] : [unfilled] lives in a house  [Humidifier] : does not use a humidifier [Window Units] : air conditioning provided by window units [Radiator/Baseboard] : heating provided by radiator(s)/baseboard(s) [Dehumidifier] : does not use a dehumidifier [Cockroaches] : Patient states that there are no cockroaches in the home [Feather Pillows] : does not have feather pillows [Dust Mite Covers] : does not have dust mite covers [Feather Comforter] : does not have a feather comforter [Bedroom] : not in the bedroom [Living Area] : not in the living area [Smokers in Household] : there are no smokers in the home [None] : none

## 2019-06-26 NOTE — HISTORY OF PRESENT ILLNESS
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 Also worse right before Mora and in Spring. Had skin testing done years ago which was negative. Has tried Flonase, Nasacort, ipratropium nose spray. Only Nasacort seemed to help when used together with Xyzal. Ipratropium caused significant dryness of eyes and nasal mucosa. \par \par Cough: Was diagnosed asthma in 2015 after a bronchitis episode. Since then has been using albuterol as needed. Was on maintenance medications including Breo and Symbicort, and reports no benefit from them despite compliance for over a month. These medications were later discontinued. Follows with a pulmonologist. Cough comes in fits, happens mostly at times of emotional stress per patient. Reports a recent coughing fit, after which her  did not want her to come to [employer] if she continues to have these episodes. She also reports that these episodes are interfering with her work when they happen during meetings. Has never needed hospitalization or oral steroids. Denies nighttime symptoms. \par \par CVID: Diagnosed after receiving rituximab, last dose in 2017. Followed by Dr Chappell and receives IVIG at his office monthly. Unsure of dose. Last infusion about a week ago. Used to have frequent sinus infections, which have improved since started IVIG. \par \par Food allergy: Concerned about food allergy since she has increased mucous production and abdominal discomfort with dairy. Has abdominal pain with corn. Reports eye swelling with soy ingestion, but can eat fried rice and other Chinese foods.

## 2019-07-02 PROBLEM — R10.84 GENERALIZED ABDOMINAL PAIN: Status: ACTIVE | Noted: 2019-07-02

## 2019-07-02 NOTE — REVIEW OF SYSTEMS
[Chills] : no chills [Fever] : no fever [Feeling Poorly] : feeling poorly [Eyesight Problems] : no eyesight problems [Eye Pain] : no eye pain [Heart Rate Is Fast] : the heart rate was not fast [Discharge From Eyes] : no purulent discharge from the eyes [Sore Throat] : no sore throat [Hoarseness] : no hoarseness [Shortness Of Breath] : no shortness of breath [Chest Pain] : no chest pain [Palpitations] : no palpitations [Cough] : no cough [As Noted in HPI] : as noted in HPI [Wheezing] : no wheezing [Negative] : Endocrine

## 2019-07-02 NOTE — ASSESSMENT
[FreeTextEntry1] : 59 yo F pmh Ocular Pemphegoid s/p Rituxan + Decadron, CVID on IGG supplementation, NAFLD following with Kelin BUTLER, who presents for evaluation of multiple GI complaints including GERD, post prandial abdominal pain, colon polyps.  Next step is endoscopic evaluation.  Will start with bowel regimen and PPI for symptomatic control.\par \par Impression:\par 1) Constipation\par 2) Post prandial abdominal pain\par 3) GERD\par 4) CVID\par 5) Ocular Pemphigoid\par 6) Colon polyps\par \par Plan:\par 1) EGD with [facility]\par 2) Colonoscopy for surveillance\par 3) Trial of PPI\par 4) Continue with MiraLAX\par 5) All discussed with patient at length.  All questions answered.  Plan agreed upon\par 6) RV pending above

## 2019-07-02 NOTE — HISTORY OF PRESENT ILLNESS
[FreeTextEntry1] : 57 yo F pmh Ocular Pemphegoid s/p Rituxan + Decadron, CVID on IGG supplementation, NAFLD following with Kelin BUTLER, who presents for evaluation of multiple GI complaints.\par \par Constipation:\par May have some current symptoms - some difficulty going to the bathroom\par No pain on defecation\par No blood or melena\par Usually goes to the bathroom 1x/day.  Currently using MiraLAX as needed.\par \par Post Prandial Pain\par Feels some discomfort and some post prandial pain\par Largely it is post prandial burning in epigastric\par No weight loss, n/v, no early fullness\par No dysphagia, no odynophagia\par No specific trigger foods - she may feel worse with beef\par \par Is associated with Cough 15 min after eating\par No regurgitation, no heartburn, no dysphagia, no chest pain, no bloating\par \par CRC Screening\par 9/2014 - Tubular adenoma resected\par Has family h/o colon cancer - 2019

## 2019-07-02 NOTE — PHYSICAL EXAM
[General Appearance - Alert] : alert [General Appearance - Well Nourished] : well nourished [Sclera] : the sclera and conjunctiva were normal [Outer Ear] : the ears and nose were normal in appearance [Extraocular Movements] : extraocular movements were intact [PERRL With Normal Accommodation] : pupils were equal in size, round, and reactive to light [Examination Of The Oral Cavity] : the lips and gums were normal [Oropharynx] : the oropharynx was normal [Neck Appearance] : the appearance of the neck was normal [Neck Cervical Mass (___cm)] : no neck mass was observed [Respiration, Rhythm And Depth] : normal respiratory rhythm and effort [Exaggerated Use Of Accessory Muscles For Inspiration] : no accessory muscle use [Auscultation Breath Sounds / Voice Sounds] : lungs were clear to auscultation bilaterally [Heart Rate And Rhythm] : heart rate was normal and rhythm regular [Heart Sounds] : normal S1 and S2 [Edema] : there was no peripheral edema [Murmurs] : no murmurs [Abdomen Tenderness] : non-tender [Bowel Sounds] : normal bowel sounds [Abdomen Soft] : soft [Cervical Lymph Nodes Enlarged Posterior Bilaterally] : posterior cervical [Abdomen Mass (___ Cm)] : no abdominal mass palpated [Cervical Lymph Nodes Enlarged Anterior Bilaterally] : anterior cervical [No Spinal Tenderness] : no spinal tenderness [Supraclavicular Lymph Nodes Enlarged Bilaterally] : supraclavicular [No CVA Tenderness] : no ~M costovertebral angle tenderness [Involuntary Movements] : no involuntary movements were seen [Skin Color & Pigmentation] : normal skin color and pigmentation [Abnormal Walk] : normal gait [No Focal Deficits] : no focal deficits [] : no rash [FreeTextEntry1] : aox3 [Impaired Insight] : insight and judgment were intact [Affect] : the affect was normal

## 2019-07-03 LAB
A ALTERNATA IGE QN: <0.1 KUA/L
A FUMIGATUS IGE QN: <0.1 KUA/L
A NIGER IGE QN: <0.1 KUA/L
AMER BEECH IGE QN: 0
BOXELDER IGE QN: <0.1 KUA/L
C HERBARUM IGE QN: <0.1 KUA/L
C LUNATA IGE QN: <0.1 KUA/L
CALIF WALNUT IGE QN: <0.1 KUA/L
CAT DANDER IGE QN: <0.1 KUA/L
CMN PIGWEED IGE QN: <0.1 KUA/L
COCKLEBUR IGE QN: <0.1 KUA/L
COCKSFOOT IGE QN: <0.1 KUA/L
COMMON RAGWEED IGE QN: <0.1 KUA/L
D FARINAE IGE QN: <0.1 KUA/L
D PTERONYSS IGE QN: <0.1 KUA/L
DEPRECATED A ALTERNATA IGE RAST QL: 0
DEPRECATED A FUMIGATUS IGE RAST QL: 0
DEPRECATED A NIGER IGE RAST QL: 0
DEPRECATED A PULLULANS IGE RAST QL: 0
DEPRECATED AMER BEECH IGE RAST QL: <0.1 KUA/L
DEPRECATED BOXELDER IGE RAST QL: 0
DEPRECATED C HERBARUM IGE RAST QL: 0
DEPRECATED C LUNATA IGE RAST QL: 0
DEPRECATED CAT DANDER IGE RAST QL: 0
DEPRECATED COCKLEBUR IGE RAST QL: 0
DEPRECATED COCKSFOOT IGE RAST QL: 0
DEPRECATED COMMON PIGWEED IGE RAST QL: 0
DEPRECATED COMMON RAGWEED IGE RAST QL: 0
DEPRECATED D FARINAE IGE RAST QL: 0
DEPRECATED D PTERONYSS IGE RAST QL: 0
DEPRECATED DOG DANDER IGE RAST QL: 0
DEPRECATED ENGL PLANTAIN IGE RAST QL: 0
DEPRECATED F MONILIFORME IGE RAST QL: 0
DEPRECATED GIANT RAGWEED IGE RAST QL: 0
DEPRECATED GOOSE FEATHER IGE RAST QL: 0
DEPRECATED GOOSEFOOT IGE RAST QL: 0
DEPRECATED HORSE DANDER IGE RAST QL: 0
DEPRECATED JOHNSON GRASS IGE RAST QL: 0
DEPRECATED KENT BLUE GRASS IGE RAST QL: 0
DEPRECATED LONDON PLANE IGE RAST QL: 0
DEPRECATED M RACEMOSUS IGE RAST QL: 0
DEPRECATED MUGWORT IGE RAST QL: 0
DEPRECATED P NOTATUM IGE RAST QL: 0
DEPRECATED R NIGRICANS IGE RAST QL: 0
DEPRECATED RABBIT MEAT IGE RAST QL: 0
DEPRECATED RED CEDAR IGE RAST QL: 0
DEPRECATED RED TOP GRASS IGE RAST QL: 0
DEPRECATED ROACH IGE RAST QL: 0
DEPRECATED SILVER BIRCH IGE RAST QL: 0
DEPRECATED WHITE ASH IGE RAST QL: 0
DEPRECATED WHITE HICKORY IGE RAST QL: 0
DEPRECATED WHITE OAK IGE RAST QL: 0
DOG DANDER IGE QN: <0.1 KUA/L
ENGL PLANTAIN IGE QN: <0.1 KUA/L
F MONILIFORME IGE QN: <0.1 KUA/L
GIANT RAGWEED IGE QN: <0.1 KUA/L
GOOSE FEATHER IGE QN: <0.1 KUA/L
GOOSEFOOT IGE QN: <0.1 KUA/L
GRAY ALDER (T2) CLASS: 0
GRAY ALDER (T2) CONC: <0.1 KUA/L
HAMSTER EPITHELIUM (E84) CLASS: 0
HAMSTER EPITHELIUM (E84) CONC: <0.1 KUA/L
HORSE DANDER IGE QN: <0.1 KUA/L
JOHNSON GRASS IGE QN: <0.1 KUA/L
KENT BLUE GRASS IGE QN: <0.1 KUA/L
LONDON PLANE IGE QN: <0.1 KUA/L
M RACEMOSUS IGE QN: <0.1 KUA/L
MOLD (AUREOBASIDIUM M12) CONC: <0.1 KUA/L
MOUSE EPITHELIUM (E71) CLASS: 0
MOUSE EPITHELIUM (E71) CONC: <0.1 KUA/L
MUGWORT IGE QN: <0.1 KUA/L
MULBERRY (T70) CLASS: 0
MULBERRY (T70) CONC: <0.1 KUA/L
P NOTATUM IGE QN: <0.1 KUA/L
R NIGRICANS IGE QN: <0.1 KUA/L
RABBIT MEAT IGE QN: <0.1 KUA/L
RED CEDAR IGE QN: <0.1 KUA/L
RED TOP GRASS IGE QN: <0.1 KUA/L
ROACH IGE QN: <0.1 KUA/L
SILVER BIRCH IGE QN: <0.1 KUA/L
TREE ALLERG MIX1 IGE QL: 0
WHITE ASH IGE QN: <0.1 KUA/L
WHITE ELM IGE QN: 0
WHITE ELM IGE QN: <0.1 KUA/L
WHITE HICKORY IGE QN: <0.1 KUA/L
WHITE OAK IGE QN: <0.1 KUA/L

## 2019-07-08 LAB
DEPRECATED TIMOTHY IGE RAST QL: 0
TIMOTHY IGE QN: <0.1 KUA/L

## 2019-08-05 ENCOUNTER — APPOINTMENT (OUTPATIENT)
Dept: GYNECOLOGIC ONCOLOGY | Facility: CLINIC | Age: 59
End: 2019-08-05
Payer: COMMERCIAL

## 2019-08-05 VITALS
DIASTOLIC BLOOD PRESSURE: 88 MMHG | WEIGHT: 255 LBS | HEIGHT: 64 IN | BODY MASS INDEX: 43.54 KG/M2 | SYSTOLIC BLOOD PRESSURE: 137 MMHG

## 2019-08-05 DIAGNOSIS — Z98.890 OTHER SPECIFIED POSTPROCEDURAL STATES: ICD-10-CM

## 2019-08-05 DIAGNOSIS — Z80.3 FAMILY HISTORY OF MALIGNANT NEOPLASM OF BREAST: ICD-10-CM

## 2019-08-05 DIAGNOSIS — Z86.018 OTHER SPECIFIED POSTPROCEDURAL STATES: ICD-10-CM

## 2019-08-05 DIAGNOSIS — Z80.59 FAMILY HISTORY OF MALIGNANT NEOPLASM OF OTHER URINARY TRACT ORGAN: ICD-10-CM

## 2019-08-05 DIAGNOSIS — Z85.42 PERSONAL HISTORY OF MALIGNANT NEOPLASM OF OTHER PARTS OF UTERUS: ICD-10-CM

## 2019-08-05 DIAGNOSIS — Z80.0 FAMILY HISTORY OF MALIGNANT NEOPLASM OF DIGESTIVE ORGANS: ICD-10-CM

## 2019-08-05 PROCEDURE — 99244 OFF/OP CNSLTJ NEW/EST MOD 40: CPT

## 2019-08-05 RX ORDER — TRIAMCINOLONE ACETONIDE 55 UG/1
55 SOLUTION/ DROPS OPHTHALMIC
Qty: 1 | Refills: 3 | Status: COMPLETED | COMMUNITY
Start: 2019-06-24 | End: 2019-08-05

## 2019-08-05 RX ORDER — ESOMEPRAZOLE MAGNESIUM 40 MG/1
40 CAPSULE, DELAYED RELEASE ORAL DAILY
Qty: 30 | Refills: 2 | Status: COMPLETED | COMMUNITY
Start: 2019-06-25 | End: 2019-08-05

## 2019-08-05 RX ORDER — SODIUM SULFATE, POTASSIUM SULFATE, MAGNESIUM SULFATE 17.5; 3.13; 1.6 G/ML; G/ML; G/ML
17.5-3.13-1.6 SOLUTION, CONCENTRATE ORAL
Qty: 1 | Refills: 0 | Status: COMPLETED | COMMUNITY
Start: 2018-09-26 | End: 2019-08-05

## 2019-08-07 ENCOUNTER — RESULT CHARGE (OUTPATIENT)
Age: 59
End: 2019-08-07

## 2019-08-07 ENCOUNTER — APPOINTMENT (OUTPATIENT)
Dept: ENDOCRINOLOGY | Facility: CLINIC | Age: 59
End: 2019-08-07
Payer: COMMERCIAL

## 2019-08-07 VITALS — WEIGHT: 260 LBS | BODY MASS INDEX: 44.63 KG/M2

## 2019-08-07 LAB — GLUCOSE BLDC GLUCOMTR-MCNC: 127

## 2019-08-07 PROCEDURE — 95250 CONT GLUC MNTR PHYS/QHP EQP: CPT

## 2019-08-07 PROCEDURE — G0108 DIAB MANAGE TRN  PER INDIV: CPT

## 2019-08-07 PROCEDURE — 95251 CONT GLUC MNTR ANALYSIS I&R: CPT

## 2019-08-08 ENCOUNTER — APPOINTMENT (OUTPATIENT)
Dept: PEDIATRIC ALLERGY IMMUNOLOGY | Facility: CLINIC | Age: 59
End: 2019-08-08

## 2019-08-23 ENCOUNTER — EMERGENCY (EMERGENCY)
Facility: HOSPITAL | Age: 59
LOS: 1 days | Discharge: HOME | End: 2019-08-23
Attending: STUDENT IN AN ORGANIZED HEALTH CARE EDUCATION/TRAINING PROGRAM
Payer: COMMERCIAL

## 2019-08-23 VITALS
SYSTOLIC BLOOD PRESSURE: 143 MMHG | OXYGEN SATURATION: 98 % | RESPIRATION RATE: 16 BRPM | TEMPERATURE: 99 F | DIASTOLIC BLOOD PRESSURE: 78 MMHG | HEART RATE: 86 BPM

## 2019-08-23 VITALS
SYSTOLIC BLOOD PRESSURE: 155 MMHG | DIASTOLIC BLOOD PRESSURE: 96 MMHG | HEART RATE: 85 BPM | RESPIRATION RATE: 20 BRPM | WEIGHT: 255.96 LBS | TEMPERATURE: 99 F | HEIGHT: 64 IN | OXYGEN SATURATION: 97 %

## 2019-08-23 DIAGNOSIS — Z98.89 OTHER SPECIFIED POSTPROCEDURAL STATES: Chronic | ICD-10-CM

## 2019-08-23 LAB
ALBUMIN SERPL ELPH-MCNC: 4.3 G/DL — SIGNIFICANT CHANGE UP (ref 3.3–5)
ALP SERPL-CCNC: 71 U/L — SIGNIFICANT CHANGE UP (ref 40–120)
ALT FLD-CCNC: 14 U/L — SIGNIFICANT CHANGE UP (ref 10–45)
ANION GAP SERPL CALC-SCNC: 10 MMOL/L — SIGNIFICANT CHANGE UP (ref 5–17)
APPEARANCE UR: CLEAR — SIGNIFICANT CHANGE UP
AST SERPL-CCNC: 16 U/L — SIGNIFICANT CHANGE UP (ref 10–40)
BACTERIA # UR AUTO: NEGATIVE — SIGNIFICANT CHANGE UP
BASOPHILS # BLD AUTO: 0 K/UL — SIGNIFICANT CHANGE UP (ref 0–0.2)
BASOPHILS NFR BLD AUTO: 0.5 % — SIGNIFICANT CHANGE UP (ref 0–2)
BILIRUB SERPL-MCNC: 0.4 MG/DL — SIGNIFICANT CHANGE UP (ref 0.2–1.2)
BILIRUB UR-MCNC: NEGATIVE — SIGNIFICANT CHANGE UP
BUN SERPL-MCNC: 11 MG/DL — SIGNIFICANT CHANGE UP (ref 7–23)
CALCIUM SERPL-MCNC: 9.9 MG/DL — SIGNIFICANT CHANGE UP (ref 8.4–10.5)
CHLORIDE SERPL-SCNC: 100 MMOL/L — SIGNIFICANT CHANGE UP (ref 96–108)
CO2 SERPL-SCNC: 27 MMOL/L — SIGNIFICANT CHANGE UP (ref 22–31)
COLOR SPEC: SIGNIFICANT CHANGE UP
CREAT SERPL-MCNC: 0.57 MG/DL — SIGNIFICANT CHANGE UP (ref 0.5–1.3)
DIFF PNL FLD: ABNORMAL
EOSINOPHIL # BLD AUTO: 0.1 K/UL — SIGNIFICANT CHANGE UP (ref 0–0.5)
EOSINOPHIL NFR BLD AUTO: 1.1 % — SIGNIFICANT CHANGE UP (ref 0–6)
EPI CELLS # UR: 1 /HPF — SIGNIFICANT CHANGE UP
GLUCOSE SERPL-MCNC: 206 MG/DL — HIGH (ref 70–99)
GLUCOSE UR QL: ABNORMAL
HCT VFR BLD CALC: 38.4 % — SIGNIFICANT CHANGE UP (ref 34.5–45)
HGB BLD-MCNC: 12.5 G/DL — SIGNIFICANT CHANGE UP (ref 11.5–15.5)
HYALINE CASTS # UR AUTO: 0 /LPF — SIGNIFICANT CHANGE UP (ref 0–2)
KETONES UR-MCNC: NEGATIVE — SIGNIFICANT CHANGE UP
LEUKOCYTE ESTERASE UR-ACNC: NEGATIVE — SIGNIFICANT CHANGE UP
LYMPHOCYTES # BLD AUTO: 1.8 K/UL — SIGNIFICANT CHANGE UP (ref 1–3.3)
LYMPHOCYTES # BLD AUTO: 25.5 % — SIGNIFICANT CHANGE UP (ref 13–44)
MCHC RBC-ENTMCNC: 27.8 PG — SIGNIFICANT CHANGE UP (ref 27–34)
MCHC RBC-ENTMCNC: 32.5 GM/DL — SIGNIFICANT CHANGE UP (ref 32–36)
MCV RBC AUTO: 85.5 FL — SIGNIFICANT CHANGE UP (ref 80–100)
MONOCYTES # BLD AUTO: 0.6 K/UL — SIGNIFICANT CHANGE UP (ref 0–0.9)
MONOCYTES NFR BLD AUTO: 8.1 % — SIGNIFICANT CHANGE UP (ref 2–14)
NEUTROPHILS # BLD AUTO: 4.5 K/UL — SIGNIFICANT CHANGE UP (ref 1.8–7.4)
NEUTROPHILS NFR BLD AUTO: 64.8 % — SIGNIFICANT CHANGE UP (ref 43–77)
NITRITE UR-MCNC: NEGATIVE — SIGNIFICANT CHANGE UP
PH UR: 6.5 — SIGNIFICANT CHANGE UP (ref 5–8)
PLATELET # BLD AUTO: 266 K/UL — SIGNIFICANT CHANGE UP (ref 150–400)
POTASSIUM SERPL-MCNC: 4 MMOL/L — SIGNIFICANT CHANGE UP (ref 3.5–5.3)
POTASSIUM SERPL-SCNC: 4 MMOL/L — SIGNIFICANT CHANGE UP (ref 3.5–5.3)
PROT SERPL-MCNC: 7.4 G/DL — SIGNIFICANT CHANGE UP (ref 6–8.3)
PROT UR-MCNC: ABNORMAL
RBC # BLD: 4.49 M/UL — SIGNIFICANT CHANGE UP (ref 3.8–5.2)
RBC # FLD: 13.1 % — SIGNIFICANT CHANGE UP (ref 10.3–14.5)
RBC CASTS # UR COMP ASSIST: 50 /HPF — HIGH (ref 0–4)
SODIUM SERPL-SCNC: 137 MMOL/L — SIGNIFICANT CHANGE UP (ref 135–145)
SP GR SPEC: 1.01 — SIGNIFICANT CHANGE UP (ref 1.01–1.02)
UROBILINOGEN FLD QL: NEGATIVE — SIGNIFICANT CHANGE UP
WBC # BLD: 6.9 K/UL — SIGNIFICANT CHANGE UP (ref 3.8–10.5)
WBC # FLD AUTO: 6.9 K/UL — SIGNIFICANT CHANGE UP (ref 3.8–10.5)
WBC UR QL: 3 /HPF — SIGNIFICANT CHANGE UP (ref 0–5)

## 2019-08-23 PROCEDURE — 74176 CT ABD & PELVIS W/O CONTRAST: CPT

## 2019-08-23 PROCEDURE — 99284 EMERGENCY DEPT VISIT MOD MDM: CPT

## 2019-08-23 PROCEDURE — 81001 URINALYSIS AUTO W/SCOPE: CPT

## 2019-08-23 PROCEDURE — 85027 COMPLETE CBC AUTOMATED: CPT

## 2019-08-23 PROCEDURE — 87186 SC STD MICRODIL/AGAR DIL: CPT

## 2019-08-23 PROCEDURE — 74176 CT ABD & PELVIS W/O CONTRAST: CPT | Mod: 26

## 2019-08-23 PROCEDURE — 80053 COMPREHEN METABOLIC PANEL: CPT

## 2019-08-23 PROCEDURE — 87086 URINE CULTURE/COLONY COUNT: CPT

## 2019-08-23 PROCEDURE — 99284 EMERGENCY DEPT VISIT MOD MDM: CPT | Mod: 25

## 2019-08-23 RX ORDER — TAMSULOSIN HYDROCHLORIDE 0.4 MG/1
1 CAPSULE ORAL
Qty: 10 | Refills: 0
Start: 2019-08-23 | End: 2019-09-01

## 2019-08-23 NOTE — ED PROVIDER NOTE - ATTENDING CONTRIBUTION TO CARE
58 F morbidly obese, hx of endometrial cancer w/ hysterectomy (stage 1), currently in remission, kidney stones requiring lithotripsy p/w hematuria gross x2 days. Pt has suprapubic discomfort. Pt afebrile in NAD, she is ambulatory w/out difficulty. Pelvic exam w/ no blood from the cervical cuff or in the vaginal vault. abdomen is soft w/ L sided cva tenderness Pt will have labs and imaging given hx of lithotripsy. Additionally, pt is very concerned about recurrence of cancer. pt was counselled after the results of ct scan that she should follow up with urology for cystoscopy to evaluate the bladder wall as well as determine what recommendations urology would have for her symptoms. Pt reports she is able to empty her bladder and has no abdominal fullness at reassessment. Pt was given return precautions regarding urinary retention and the need for cano if in retention and clots persist. pt reports understanding.

## 2019-08-23 NOTE — ED ADULT NURSE NOTE - NSIMPLEMENTINTERV_GEN_ALL_ED
Implemented All Universal Safety Interventions:  [place] to call system. Call bell, personal items and telephone within reach. Instruct patient to call for assistance. Room bathroom lighting operational. Non-slip footwear when patient is off stretcher. Physically safe environment: no spills, clutter or unnecessary equipment. Stretcher in lowest position, wheels locked, appropriate side rails in place.

## 2019-08-23 NOTE — ED PROVIDER NOTE - NSFOLLOWUPINSTRUCTIONS_ED_ALL_ED_FT
Please follow up with urology in the next week for further care and assessment     Take flomax once daily, as prescribed     Return to hospital for any new or concerning symptoms, including but not limited to: fevers, chills, nausea, vomiting, headache, dizziness, lightheadedness, chest pain, shortness of breath, difficulty breathing, abdominal pain, weakness, or any other new or concerning symptoms.     Take Tylenol up to 650 mg every 6 hours as needed for pain.

## 2019-08-23 NOTE — ED PROVIDER NOTE - CLINICAL SUMMARY MEDICAL DECISION MAKING FREE TEXT BOX
58F, extensive pmhx including DM, HTN, HLD, kidney stones (s/p lithotripsy in past), endometrial ca (s/p CHELSEA) presenting w 1 day urinary sx including dysuria, hematuria, suprapubic pain. Exam as above. Will check basic labs, UA, and ct stone hunt to r/o renal stone. Currently stable, no acute distress. Will continue to follow up and re-assess. Case discussed with Attending.  Shad Neal MD, PGY3 Emergency Medicine

## 2019-08-23 NOTE — ED ADULT NURSE NOTE - OBJECTIVE STATEMENT
1100 58 yr old WF brought to ER by  for further eval and tx of burning upon urination, hematuria, urinary urgency, and periumbillical pain radiating down to suprapubic region since last night associated with chills. Recent sinusitis. Finished Cipro 3 days ago. A&Ox4. Ambulatory. Color pink. skin W&D. Lungs clear. abd soft

## 2019-08-23 NOTE — ED PROVIDER NOTE - PHYSICAL EXAMINATION
General: Well appearing, alert, oriented, no acute distress. Obese.  Resting in bed.  HEENT: PERRLA EOMI. No trauma/bruising noted to head or face. No lip/tongue/throat swelling noted on exam.  CV: Regular rate and rhythm, S1/S2, no murmurs/rubs/gallops noted on exam.  Lungs: Clear to ascultation bilaterally, no wheezes/crackles/rales noted on exam. Equal chest wall excursion noted.   Abdomen: Soft, non distended, no guarding or rebound. Minimally tenderness to palpation to suprapubic. No CVA tenderness to palpation.   MSK: Full ROM of upper and lower extremities bilaterally. Full ROM of neck.   Neuro: Awake, A+O x4, moving all extremities spontaneously. CN 2-12 grossly intact. No nystagmus noted. Strength and sensation grossly intact to all extremities.   Extremities: No swelling or edema noted to extremities.   Skin: No rash or bruising noted on exam. General: Well appearing, alert, oriented, no acute distress. Obese.  Resting in bed.  HEENT: PERRLA EOMI. No trauma/bruising noted to head or face. No lip/tongue/throat swelling noted on exam.  CV: Regular rate and rhythm, S1/S2, no murmurs/rubs/gallops noted on exam.  Lungs: Clear to ascultation bilaterally, no wheezes/crackles/rales noted on exam. Equal chest wall excursion noted.   Abdomen: Soft, non distended, no guarding or rebound. Minimally tenderness to palpation to suprapubic. No CVA tenderness to palpation.   MSK: Full ROM of upper and lower extremities bilaterally. Full ROM of neck.   Neuro: Awake, A+O x4, moving all extremities spontaneously. CN 2-12 grossly intact. No nystagmus noted. Strength and sensation grossly intact to all extremities.   Extremities: No swelling or edema noted to extremities.   Skin: No rash or bruising noted on exam.     exam (chaperone Dr Live) - no blood noted in vaginal canal

## 2019-08-23 NOTE — ED PROVIDER NOTE - NS ED ROS FT
Please see HPI section of chart for further detailed Review of Systems    urinary frequency, dysuria, hematuria, suprapubic pain, chills

## 2019-08-23 NOTE — ED PROVIDER NOTE - NSFOLLOWUPCLINICS_GEN_ALL_ED_FT
[facility] - Urology  Urology  [address], [address] & [address]th [place] [address]  Phone: [phone]  Fax:   Follow Up Time:

## 2019-08-26 NOTE — ED POST DISCHARGE NOTE - DETAILS
8/26/19 M to call back Reshma Charlton Pt followed up with urology, was started on Macrobid based on our culture.  Millie

## 2019-08-29 ENCOUNTER — APPOINTMENT (OUTPATIENT)
Dept: ENDOCRINOLOGY | Facility: CLINIC | Age: 59
End: 2019-08-29
Payer: COMMERCIAL

## 2019-08-29 VITALS — HEIGHT: 63.5 IN | WEIGHT: 254 LBS | BODY MASS INDEX: 44.45 KG/M2

## 2019-08-29 VITALS — OXYGEN SATURATION: 98 % | HEART RATE: 79 BPM | DIASTOLIC BLOOD PRESSURE: 82 MMHG | SYSTOLIC BLOOD PRESSURE: 124 MMHG

## 2019-08-29 PROCEDURE — ZZZZZ: CPT

## 2019-08-29 PROCEDURE — 99214 OFFICE O/P EST MOD 30 MIN: CPT

## 2019-08-29 PROCEDURE — 77085 DXA BONE DENSITY AXL VRT FX: CPT

## 2019-08-29 NOTE — PHYSICAL EXAM
[Alert] : alert [No Acute Distress] : no acute distress [Well Nourished] : well nourished [Well Developed] : well developed [Thyroid Not Enlarged] : the thyroid was not enlarged [Normal Hearing] : hearing was normal [No Respiratory Distress] : no respiratory distress [No Accessory Muscle Use] : no accessory muscle use [Normal Rate and Effort] : normal respiratory rhythm and effort [Clear to Auscultation] : lungs were clear to auscultation bilaterally [Normal Rate] : heart rate was normal  [Normal S1, S2] : normal S1 and S2 [Pedal Pulses Normal] : the pedal pulses are present [Regular Rhythm] : with a regular rhythm [Normal Bowel Sounds] : normal bowel sounds [No Edema] : there was no peripheral edema [Soft] : abdomen soft [Not Tender] : non-tender [Normal Gait] : normal gait [No Joint Swelling] : no joint swelling seen [No Stigmata of Cushings Syndrome] : no stigmata of cushings syndrome [Normal Strength/Tone] : muscle strength and tone were normal [No Rash] : no rash [Right Foot Was Examined] : right foot ~C was examined [Left Foot Was Examined] : left foot ~C was examined [Normal] : normal [Full ROM] : with full range of motion [2+] : 2+ in the dorsalis pedis [Normal Reflexes] : deep tendon reflexes were 2+ and symmetric [No Tremors] : no tremors [No Motor Deficits] : the motor exam was normal [Normal Insight/Judgement] : insight and judgment were intact [Normal Affect] : the affect was normal [Normal Mood] : the mood was normal [Foot Ulcers] : no foot ulcers [Swelling] : not swollen [Tenderness] : not tender [Erythema] : not erythematous [#2 Diminished] : number 2 was normal [#1 Diminished] : number 1 was normal [#3 Diminished] : number 3 was normal [#4 Diminished] : number 4 was normal [#6 Diminished] : number 6 was normal [#5 Diminished] : number 5 was normal [#7 Diminished] : number 7 was normal [#8 Diminished] : number 8 was normal [#9 Diminished] : number 9 was normal [de-identified] : BMI 45 [#10 Diminished] : number 10 was normal

## 2019-08-29 NOTE — HISTORY OF PRESENT ILLNESS
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 Has had prediabetes [id number]. She then developed her immune deficiency and required rituxan. With the rituxan she was getting decadron. Then with the decadron she developed diabetes. Has not had rituxan and has not had steroids since 2017. Has been on insulin since 2013. Lantus 60 units daily and metformin 1000 mg BID. Had previously been on Victoza and developed nausea. Used to see Dr. Angulo for diabetes about five years. She was afraid of Victoza. At the last visit, we discussed side effects of GLP1 agonist and she was willing to try Trulicity.  Currently taking Trulicity 0.75 mg weekly\par Does not check sugars\par Breakfast: coffee, 2-3 times a week eggs or rye toast with butter; packet of Jehovah's witness Oats\par Lunch: chicken salad on a bun\par Patient snacks less\par Dinner: chicken, broccoli, fish. Less pasta and less rice.  Has cut down on pasta\par No sweets, occasional dark chocolate\par No soda, no juice\par Dilated eye exam: April 2019\par [place] [employer] but no exercise\par Last POCT June 2019 A1c 8.7%\par Was considering gastric sleeve\par \par Hx of uterine cancer s/p CHELSEA-BSO in 2016. Had menopause prior to surgery. Had screening bone density early 2000\par No interval fractures\par Last dental visit: Had implant in June, waiting to put post in.  Plans to do it in October\par Does not drink milk, once in a while yogurt, not much dairy\par Vitamin D supplements\par  [Excessive Alcohol Intake] : no past or present history of excessive alcohol intake [Current Smoking___(ppd)] : not currently smoking [Frequent Falls] : no frequent falls [High Fall Risk] : no fall risk [Hyperparathyroidism] : no hyperparathyroidism [Family History of Breast Cancer] : no family history of breast cancer [Previous Fragility Fracture] : no previous fragility fracture [de-identified] : Sister with a fracture had lung cancer

## 2019-08-29 NOTE — RESULTS/DATA
[Hologic] : hologic [Other: ________] : [unfilled] [T-Score ___] : T-score: [unfilled] [FreeTextEntry2] : 8/29/19 [de-identified] : L1 T -0.9, L2 T -1.0, total -1.7

## 2019-08-29 NOTE — ASSESSMENT
[FreeTextEntry1] : Patient is a 57 yo woman with uncontrolled T2DM (POCT A1c 8.7%), obesity, HLD following up for diabetes\par \par 1. Uncontrolled Type 2 DM\par -patient has improved dietary choices, still not checking sugars. Alyssa only lasted for four days.\par Extensive discussion had regarding the importance of behavioral modifications including decreasing carbs and sugars, especially with history of NAFLD. Micro and macrovascular disease of uncontrolled diabetes discussed\par -she is on high dose lantus which as a side effect is weight gain. Continue trulicity, if A1c is not at goal can increase dose. GI side effects discussed. No personal or family hx of pancreatitis or medullary thyroid cancer\par -continue the metformin\par -weight loss goal of 6 for this visit was achieved\par -was told of having sleep apnea but has gone untreated. Strongly recommend sleep study and appropriate treatment\par -patient states she is depressed and not optimized on lexapro. Recommend consideration of wellbutrin for weight benefit. She has no seizure history. Defer to PCP\par \par 2. Osteoporosis\par -patient with 1/3 radius T score of -2.9\par -currently got tooth extracted for implant\par -fall risk precautions discussed\par -check vitamin D\par -anticipate oral bisphosphonate treatment in January when dental work is complete\par \par 3. HLD\par -statin\par \par Follow up in January [Carbohydrate Consistent Diet] : carbohydrate consistent diet [Hypoglycemia Management] : hypoglycemia management [Long Term Vascular Complications] : long term vascular complications of diabetes [Diabetes Foot Care] : diabetes foot care [Self Monitoring of Blood Glucose] : self monitoring of blood glucose [Importance of Diet and Exercise] : importance of diet and exercise to improve glycemic control, achieve weight loss and improve cardiovascular health [Insulin Self-Administration] : insulin self-administration [Injection Technique, Storage, Sharps Disposal] : injection technique, storage, and sharps disposal [Retinopathy Screening] : Patient was referred to ophthalmology for retinopathy screening

## 2019-08-29 NOTE — REVIEW OF SYSTEMS
[Negative] : Eyes [All other systems negative] : All other systems negative [Fatigue] : no fatigue [Decreased Appetite] : appetite not decreased [Chest Pain] : no chest pain [Palpitations] : no palpitations [Shortness Of Breath] : no shortness of breath [Wheezing] : no wheezing was heard [Cough] : no cough [SOB on Exertion] : no shortness of breath during exertion

## 2019-08-30 ENCOUNTER — APPOINTMENT (OUTPATIENT)
Dept: UROLOGY | Facility: CLINIC | Age: 59
End: 2019-08-30

## 2019-09-03 ENCOUNTER — RX RENEWAL (OUTPATIENT)
Age: 59
End: 2019-09-03

## 2019-09-03 LAB
25(OH)D3 SERPL-MCNC: 29.9 NG/ML
ALBUMIN SERPL ELPH-MCNC: 4.7 G/DL
ALP BLD-CCNC: 84 U/L
ALT SERPL-CCNC: 13 U/L
ANION GAP SERPL CALC-SCNC: 16 MMOL/L
AST SERPL-CCNC: 12 U/L
BILIRUB SERPL-MCNC: 0.3 MG/DL
BUN SERPL-MCNC: 15 MG/DL
CALCIUM SERPL-MCNC: 9.7 MG/DL
CALCIUM SERPL-MCNC: 9.7 MG/DL
CHLORIDE SERPL-SCNC: 98 MMOL/L
CHOLEST SERPL-MCNC: 162 MG/DL
CHOLEST/HDLC SERPL: 4.2 RATIO
CO2 SERPL-SCNC: 23 MMOL/L
CREAT SERPL-MCNC: 0.51 MG/DL
CREAT SPEC-SCNC: 63 MG/DL
ESTIMATED AVERAGE GLUCOSE: 200 MG/DL
GLUCOSE SERPL-MCNC: 371 MG/DL
HBA1C MFR BLD HPLC: 8.6 %
HDLC SERPL-MCNC: 39 MG/DL
LDLC SERPL CALC-MCNC: 84 MG/DL
MICROALBUMIN 24H UR DL<=1MG/L-MCNC: 3.6 MG/DL
MICROALBUMIN/CREAT 24H UR-RTO: 57 MG/G
PARATHYROID HORMONE INTACT: 52 PG/ML
POTASSIUM SERPL-SCNC: 5 MMOL/L
PROT SERPL-MCNC: 6.7 G/DL
SODIUM SERPL-SCNC: 137 MMOL/L
TRIGL SERPL-MCNC: 196 MG/DL
TSH SERPL-ACNC: 2.3 UIU/ML

## 2019-09-06 ENCOUNTER — APPOINTMENT (OUTPATIENT)
Dept: UROLOGY | Facility: CLINIC | Age: 59
End: 2019-09-06

## 2019-09-11 ENCOUNTER — OUTPATIENT (OUTPATIENT)
Dept: OUTPATIENT SERVICES | Facility: HOSPITAL | Age: 59
LOS: 1 days | End: 2019-09-11
Payer: COMMERCIAL

## 2019-09-11 ENCOUNTER — OTHER (OUTPATIENT)
Age: 59
End: 2019-09-11

## 2019-09-11 VITALS
TEMPERATURE: 99 F | SYSTOLIC BLOOD PRESSURE: 125 MMHG | DIASTOLIC BLOOD PRESSURE: 85 MMHG | OXYGEN SATURATION: 97 % | HEIGHT: 64 IN | RESPIRATION RATE: 16 BRPM | HEART RATE: 74 BPM | WEIGHT: 255.07 LBS

## 2019-09-11 DIAGNOSIS — Z98.890 OTHER SPECIFIED POSTPROCEDURAL STATES: Chronic | ICD-10-CM

## 2019-09-11 DIAGNOSIS — Z90.89 ACQUIRED ABSENCE OF OTHER ORGANS: Chronic | ICD-10-CM

## 2019-09-11 DIAGNOSIS — Z90.710 ACQUIRED ABSENCE OF BOTH CERVIX AND UTERUS: Chronic | ICD-10-CM

## 2019-09-11 DIAGNOSIS — Z98.89 OTHER SPECIFIED POSTPROCEDURAL STATES: Chronic | ICD-10-CM

## 2019-09-11 DIAGNOSIS — Z91.89 OTHER SPECIFIED PERSONAL RISK FACTORS, NOT ELSEWHERE CLASSIFIED: ICD-10-CM

## 2019-09-11 DIAGNOSIS — R10.9 UNSPECIFIED ABDOMINAL PAIN: ICD-10-CM

## 2019-09-11 DIAGNOSIS — E11.9 TYPE 2 DIABETES MELLITUS WITHOUT COMPLICATIONS: ICD-10-CM

## 2019-09-11 DIAGNOSIS — Z01.818 ENCOUNTER FOR OTHER PREPROCEDURAL EXAMINATION: ICD-10-CM

## 2019-09-11 DIAGNOSIS — K63.5 POLYP OF COLON: ICD-10-CM

## 2019-09-11 LAB
ANION GAP SERPL CALC-SCNC: 15 MMOL/L — SIGNIFICANT CHANGE UP (ref 5–17)
BUN SERPL-MCNC: 12 MG/DL — SIGNIFICANT CHANGE UP (ref 7–23)
CALCIUM SERPL-MCNC: 9.9 MG/DL — SIGNIFICANT CHANGE UP (ref 8.4–10.5)
CHLORIDE SERPL-SCNC: 99 MMOL/L — SIGNIFICANT CHANGE UP (ref 96–108)
CO2 SERPL-SCNC: 24 MMOL/L — SIGNIFICANT CHANGE UP (ref 22–31)
CREAT SERPL-MCNC: 0.54 MG/DL — SIGNIFICANT CHANGE UP (ref 0.5–1.3)
GLUCOSE SERPL-MCNC: 288 MG/DL — HIGH (ref 70–99)
HCT VFR BLD CALC: 39.5 % — SIGNIFICANT CHANGE UP (ref 34.5–45)
HGB BLD-MCNC: 12.8 G/DL — SIGNIFICANT CHANGE UP (ref 11.5–15.5)
MCHC RBC-ENTMCNC: 27.1 PG — SIGNIFICANT CHANGE UP (ref 27–34)
MCHC RBC-ENTMCNC: 32.4 GM/DL — SIGNIFICANT CHANGE UP (ref 32–36)
MCV RBC AUTO: 83.7 FL — SIGNIFICANT CHANGE UP (ref 80–100)
PLATELET # BLD AUTO: 325 K/UL — SIGNIFICANT CHANGE UP (ref 150–400)
POTASSIUM SERPL-MCNC: 4.5 MMOL/L — SIGNIFICANT CHANGE UP (ref 3.5–5.3)
POTASSIUM SERPL-SCNC: 4.5 MMOL/L — SIGNIFICANT CHANGE UP (ref 3.5–5.3)
RBC # BLD: 4.72 M/UL — SIGNIFICANT CHANGE UP (ref 3.8–5.2)
RBC # FLD: 13.2 % — SIGNIFICANT CHANGE UP (ref 10.3–14.5)
SODIUM SERPL-SCNC: 138 MMOL/L — SIGNIFICANT CHANGE UP (ref 135–145)
WBC # BLD: 9.09 K/UL — SIGNIFICANT CHANGE UP (ref 3.8–10.5)
WBC # FLD AUTO: 9.09 K/UL — SIGNIFICANT CHANGE UP (ref 3.8–10.5)

## 2019-09-11 PROCEDURE — 93010 ELECTROCARDIOGRAM REPORT: CPT

## 2019-09-11 PROCEDURE — 85027 COMPLETE CBC AUTOMATED: CPT

## 2019-09-11 PROCEDURE — 80048 BASIC METABOLIC PNL TOTAL CA: CPT

## 2019-09-11 PROCEDURE — 93005 ELECTROCARDIOGRAM TRACING: CPT

## 2019-09-11 PROCEDURE — G0463: CPT

## 2019-09-11 NOTE — H&P PST ADULT - NSICDXPASTSURGICALHX_GEN_ALL_CORE_FT
PAST SURGICAL HISTORY:  History of ovarian cystectomy PAST SURGICAL HISTORY:  H/O lithotripsy     History of ovarian cystectomy 1979 - dermoid cyst    History of tonsillectomy     S/P colonoscopy with polypectomy approximately 4 years ago, per pt    S/P CHELSEA-BSO 2016

## 2019-09-11 NOTE — H&P PST ADULT - NSICDXPROBLEM_GEN_ALL_CORE_FT
PROBLEM DIAGNOSES  Problem: Abdominal pain  Assessment and Plan: Colonoscopy  EGD    Problem: Type 2 diabetes mellitus  Assessment and Plan: Pt instructed to hold metformin 24 hours prior to procedue  Pt instructed to take 80% of regular lantus dose on evening prior to surgery(=48 units)  Pt instructed to check FS on am of surgery  Stat FS on admission  Dr Cantu's office called/message left with staff that patient's A1C = 8.6 on 8/29/19 - awaiting response from medical staff (Daja MCINTOSH)    Problem: At risk for sleep apnea  Assessment and Plan: IVORY Precautions  Endoscopy Booking notified

## 2019-09-11 NOTE — H&P PST ADULT - HISTORY OF PRESENT ILLNESS
59 yo female with h/o Type 2 DM, hyperlipidemia, morbid obesity (BMI=43.8), endometrial CA s/p CHELSEA-BSO 2016), common variable immunodeficiency (receives IVIG monthly) and c/o intermittent epigastric pain. Pt is scheduled for RGD and colonoscopy on 9/18/2019.

## 2019-09-11 NOTE — H&P PST ADULT - OTHER CARE PROVIDERS
Dr Shirley Huber Endocrinologist Dr Shirley Huber Endocrinologist [phone]; Dr Joe Immunologist [phone]

## 2019-09-11 NOTE — H&P PST ADULT - GENERAL GENITOURINARY SYMPTOMS
hematuria/dysuria/bladder infections/8/22/2019 - UTI - treated with Macrobid for 7 days, no current symptoms bladder infections/hematuria/dysuria/8/22/2019 - UTI - treated with Macrobid for 7 days, denies dysuria/hematuria at present

## 2019-09-11 NOTE — H&P PST ADULT - NSANTHOSAYNRD_GEN_A_CORE
No. IVORY screening performed.  STOP BANG Legend: 0-2 = LOW Risk; 3-4 = INTERMEDIATE Risk; 5-8 = HIGH Risk neck = 15.5 inches/No. IVORY screening performed.  STOP BANG Legend: 0-2 = LOW Risk; 3-4 = INTERMEDIATE Risk; 5-8 = HIGH Risk

## 2019-09-11 NOTE — H&P PST ADULT - NSICDXPASTMEDICALHX_GEN_ALL_CORE_FT
PAST MEDICAL HISTORY:  Asthma, allergic triggered by environmental factors - perfume, paint, incense    Common variable immunodeficiency IVIG monthly - last received in July 2019, due 9/26/2019    Depression     Dyslipidemia     Hypertension     Morbid obesity     NAFLD (nonalcoholic fatty liver disease)     Osteoporosis     Type 2 diabetes mellitus PAST MEDICAL HISTORY:  Acute UTI 8/2019 - treated with oral antibiotics, symptoms now resolved    Asthma, allergic triggered by environmental factors - perfume, paint, incense    Common variable immunodeficiency IVIG monthly - last received in July 2019, due 9/26/2019    Depression on lexapro    Dyslipidemia     History of endometrial cancer stage 1 per pt - s/p CHELSEA-BSO 2016    Hypertension no current medications    Morbid obesity BMI=43.8    NAFLD (nonalcoholic fatty liver disease)     Osteoporosis     Type 2 diabetes mellitus Hemoglobin A1C on 8/29/2019=8.6

## 2019-09-18 ENCOUNTER — OUTPATIENT (OUTPATIENT)
Dept: OUTPATIENT SERVICES | Facility: HOSPITAL | Age: 59
LOS: 1 days | End: 2019-09-18
Payer: COMMERCIAL

## 2019-09-18 ENCOUNTER — RESULT REVIEW (OUTPATIENT)
Age: 59
End: 2019-09-18

## 2019-09-18 ENCOUNTER — APPOINTMENT (OUTPATIENT)
Dept: GASTROENTEROLOGY | Facility: HOSPITAL | Age: 59
End: 2019-09-18

## 2019-09-18 DIAGNOSIS — Z98.89 OTHER SPECIFIED POSTPROCEDURAL STATES: Chronic | ICD-10-CM

## 2019-09-18 DIAGNOSIS — Z01.818 ENCOUNTER FOR OTHER PREPROCEDURAL EXAMINATION: ICD-10-CM

## 2019-09-18 DIAGNOSIS — Z98.890 OTHER SPECIFIED POSTPROCEDURAL STATES: Chronic | ICD-10-CM

## 2019-09-18 DIAGNOSIS — Z90.89 ACQUIRED ABSENCE OF OTHER ORGANS: Chronic | ICD-10-CM

## 2019-09-18 DIAGNOSIS — Z90.710 ACQUIRED ABSENCE OF BOTH CERVIX AND UTERUS: Chronic | ICD-10-CM

## 2019-09-18 DIAGNOSIS — K63.5 POLYP OF COLON: ICD-10-CM

## 2019-09-18 PROCEDURE — 88342 IMHCHEM/IMCYTCHM 1ST ANTB: CPT | Mod: 26

## 2019-09-18 PROCEDURE — 88312 SPECIAL STAINS GROUP 1: CPT

## 2019-09-18 PROCEDURE — 45380 COLONOSCOPY AND BIOPSY: CPT | Mod: 59

## 2019-09-18 PROCEDURE — 88312 SPECIAL STAINS GROUP 1: CPT | Mod: 26

## 2019-09-18 PROCEDURE — 88305 TISSUE EXAM BY PATHOLOGIST: CPT

## 2019-09-18 PROCEDURE — 45385 COLONOSCOPY W/LESION REMOVAL: CPT | Mod: PT

## 2019-09-18 PROCEDURE — 88341 IMHCHEM/IMCYTCHM EA ADD ANTB: CPT

## 2019-09-18 PROCEDURE — 88341 IMHCHEM/IMCYTCHM EA ADD ANTB: CPT | Mod: 26

## 2019-09-18 PROCEDURE — 43239 EGD BIOPSY SINGLE/MULTIPLE: CPT

## 2019-09-18 PROCEDURE — 45385 COLONOSCOPY W/LESION REMOVAL: CPT

## 2019-09-18 PROCEDURE — 88305 TISSUE EXAM BY PATHOLOGIST: CPT | Mod: 26

## 2019-09-18 PROCEDURE — 45380 COLONOSCOPY AND BIOPSY: CPT | Mod: PT,XS

## 2019-09-20 PROCEDURE — 91035 G-ESOPH REFLX TST W/ELECTROD: CPT | Mod: 26

## 2019-09-23 ENCOUNTER — TRANSCRIPTION ENCOUNTER (OUTPATIENT)
Age: 59
End: 2019-09-23

## 2019-09-26 ENCOUNTER — OTHER (OUTPATIENT)
Age: 59
End: 2019-09-26

## 2019-10-03 ENCOUNTER — OTHER (OUTPATIENT)
Age: 59
End: 2019-10-03

## 2019-10-04 ENCOUNTER — OTHER (OUTPATIENT)
Age: 59
End: 2019-10-04

## 2019-10-04 RX ORDER — DOXYCYCLINE HYCLATE 100 MG/1
100 CAPSULE ORAL TWICE DAILY
Qty: 28 | Refills: 0 | Status: DISCONTINUED | COMMUNITY
Start: 2019-09-26 | End: 2019-10-04

## 2019-10-30 PROBLEM — J45.909 UNSPECIFIED ASTHMA, UNCOMPLICATED: Chronic | Status: ACTIVE | Noted: 2019-09-11

## 2019-10-30 PROBLEM — N39.0 URINARY TRACT INFECTION, SITE NOT SPECIFIED: Chronic | Status: ACTIVE | Noted: 2019-09-11

## 2019-10-30 PROBLEM — Z85.42 PERSONAL HISTORY OF MALIGNANT NEOPLASM OF OTHER PARTS OF UTERUS: Chronic | Status: ACTIVE | Noted: 2019-09-11

## 2019-10-30 PROBLEM — M81.0 AGE-RELATED OSTEOPOROSIS WITHOUT CURRENT PATHOLOGICAL FRACTURE: Chronic | Status: ACTIVE | Noted: 2019-09-11

## 2019-10-30 PROBLEM — F32.9 MAJOR DEPRESSIVE DISORDER, SINGLE EPISODE, UNSPECIFIED: Chronic | Status: ACTIVE | Noted: 2019-09-11

## 2019-10-30 PROBLEM — D83.9 COMMON VARIABLE IMMUNODEFICIENCY, UNSPECIFIED: Chronic | Status: ACTIVE | Noted: 2019-09-11

## 2019-10-30 PROBLEM — K76.0 FATTY (CHANGE OF) LIVER, NOT ELSEWHERE CLASSIFIED: Chronic | Status: ACTIVE | Noted: 2019-09-11

## 2019-11-12 NOTE — ED ADULT NURSE NOTE - NSFALLRSKPASTHIST_ED_ALL_ED
1. XR of right knee today  2. Will give trial of meloxicam for knee swelling  3. Elevate feet -  Cannot use compression stockings  4.  HD flu vaccine today  5. F/U in 6mth  
no

## 2019-12-06 ENCOUNTER — APPOINTMENT (OUTPATIENT)
Dept: GASTROENTEROLOGY | Facility: CLINIC | Age: 59
End: 2019-12-06
Payer: COMMERCIAL

## 2019-12-06 VITALS
RESPIRATION RATE: 16 BRPM | SYSTOLIC BLOOD PRESSURE: 133 MMHG | HEART RATE: 82 BPM | WEIGHT: 245 LBS | HEIGHT: 63.5 IN | BODY MASS INDEX: 42.87 KG/M2 | TEMPERATURE: 98.3 F | DIASTOLIC BLOOD PRESSURE: 82 MMHG | OXYGEN SATURATION: 96 %

## 2019-12-06 DIAGNOSIS — K21.9 GASTRO-ESOPHAGEAL REFLUX DISEASE W/OUT ESOPHAGITIS: ICD-10-CM

## 2019-12-06 DIAGNOSIS — K59.00 CONSTIPATION, UNSPECIFIED: ICD-10-CM

## 2019-12-06 PROCEDURE — 99214 OFFICE O/P EST MOD 30 MIN: CPT

## 2019-12-06 NOTE — HISTORY OF PRESENT ILLNESS
[FreeTextEntry1] : Last visit: 6/2019\par Plan after last visit:\par Chronic GERD (530.81) (K21.9)\par Chronic cough (786.2) (R05)\par Colon polyp (211.3) (K63.5)\par NAFLD (nonalcoholic fatty liver disease) (571.8) (K76.0)\par Obesity, Class III, BMI 40-49.9 (morbid obesity) (278.01) (E66.01)\par Generalized abdominal pain (789.07) (R10.84)\par \par 57 yo F pmh Ocular Pemphigoid s/p Rituxan + Decadron, CVID on IGG supplementation, NAFLD following with Kelin BUTLER, who presents for evaluation of multiple GI complaints including GERD, post prandial abdominal pain, colon polyps. Next step is endoscopic evaluation. Will start with bowel regimen and PPI for symptomatic control.\par \par Impression:\par 1) Constipation\par 2) Post prandial abdominal pain\par 3) GERD\par 4) CVID\par 5) Ocular Pemphigoid\par 6) Colon polyps\par \par Plan:\par 1) EGD with Bravo\par 2) Colonoscopy for surveillance\par 3) Trial of PPI\par 4) Continue with MiraLAX\par 5) All discussed with patient at length. All questions answered. Plan agreed upon\par 6) RV pending above. \par -------------------------------------------------------------------------------\par \par H Pylori\par Treated 20 years ago\par Finished quad therapy - at least 5 weeks out from last dose\par No acid medication currently\par \par Colon polyps - due in 3 years - 9/2022\par \par Cough:\par Still present\par Negative Bravo with no symptom correlation\par She plans to follow with ENT as this has previously responded to intranasal therapy\par \par Obesity:\par Lost 10 lbs with diet/exercise\par Considering Bariatric evaluation\par Wishes to consider medical therapy first - will refer to Dr. Cedeño practice\par \par Constipation - doing well on MiraLAX\par -----------------------------------------------------------------------------------------------------\par Testing 9/2019\par EGD:\par Esophagitis\par GEJ polyp\par Small HH\par \par Colon: 5 polyps - removed\par Otherwise normal\par Bravo:\par Negative pH study \par 0/81 events associated with cough\par  \par Pathology:\par Final Diagnosis\par \par 1. Transverse colon, polyps, biopsy\par - Hyperplastic polyp(s)\par - Tubular adenoma(s)\par \par 2. Transverse colon, polyp 2, biopsy\par - Colonic mucosa with a prominent lymphoid follicle\par \par 3. Descending colon polyp, biopsy\par - Tubular adenoma\par \par 4. Stomach, biopsy\par - Focally active chronic gastritis\par - Rare H. pylori organisms identified on Warthin-Starry\par stain\par \par 5. Stomach, polyp, biopsy\par - Hyperplastic polyp with active inflammation\par \par 6. Esophagus, polyp, biopsy\par - Squamous mucosa with active esophagitis and a strip of\par mucinous epithelial cells. See note\par - Negative for fungus on GMS\par Note: This strip of mucinous epithelium is bland without\par cytologic atypia. Deeper levels also show this epithelium, focally\par connected to the squamous epithelium; IHC for CDX2 and PAX 8 is\par negative. If this specimen is from the GE junction, this probably\par represents cardiac-type epithelium. However, given the clinical\par impression of a polyp, a deeper biopsy may be considered.\par \par 7. Distal esophagus, biopsy\par - Squamous epithelium with no significant histopathologic\par changes\par - Negative for fungus on GMS\par \par 8. Mid/Proximal esophagus, biopsy\par - Squamous epithelium with no significant histopathologic\par changes\par - Negative for fungus on GMS

## 2019-12-06 NOTE — ASSESSMENT
[FreeTextEntry1] : 60 yo F pmh Ocular Pemphigoid s/p Rituxan + Decadron, CVID on IGG supplementation, NAFLD following with Kelin BUTLER, who presents for follow up.  She is s/p HP treatment, needs confirmation of eradication.  She is due for colonoscopy in 2022 for polyp surveillance. Her cough does not appear to be GERD related given negative bravo with no sx correlation.  Would defer next steps to ENT/PCP.  If no other causes seen, can consider 24 hour ph impedance to consider non-acidic reflux or non captured reflux on bravo.  \par \par Impression:\par 1) HP Infection\par 2) Cough\par 3) Colon Polyps\par 4) Obesity c/b NAFLD\par 5) CVID\par 6) Ocular Pemphigoid\par 7) Constipation\par \par Plan:\par 1) HP breath test\par 2) Colon 2022\par 3) Miralax for Constipation\par 4) f/u ENT/PCP for cough\par 5) Referral to Dr. Cedeño for obesity medical care and evaluation\par 6) If rest of cough workup is negative, can consider 24 hour ph impedance down the road\par 7) All discussed with patient at length. All questions answered. Plan agreed upon\par 8) RV PRN to GI, should follow up with Kelin for NAFLD

## 2019-12-06 NOTE — PHYSICAL EXAM
[General Appearance - Alert] : alert [General Appearance - In No Acute Distress] : in no acute distress [General Appearance - Well Nourished] : well nourished [General Appearance - Well-Appearing] : healthy appearing [Sclera] : the sclera and conjunctiva were normal [PERRL With Normal Accommodation] : pupils were equal in size, round, and reactive to light [Extraocular Movements] : extraocular movements were intact [Outer Ear] : the ears and nose were normal in appearance [Examination Of The Oral Cavity] : the lips and gums were normal [Oropharynx] : the oropharynx was normal [Neck Appearance] : the appearance of the neck was normal [Neck Cervical Mass (___cm)] : no neck mass was observed [Thyroid Diffuse Enlargement] : the thyroid was not enlarged [Respiration, Rhythm And Depth] : normal respiratory rhythm and effort [Exaggerated Use Of Accessory Muscles For Inspiration] : no accessory muscle use [Auscultation Breath Sounds / Voice Sounds] : lungs were clear to auscultation bilaterally [Heart Rate And Rhythm] : heart rate was normal and rhythm regular [Heart Sounds] : normal S1 and S2 [Murmurs] : no murmurs [Edema] : there was no peripheral edema [Bowel Sounds] : normal bowel sounds [Abdomen Soft] : soft [Abdomen Tenderness] : non-tender [Abdomen Mass (___ Cm)] : no abdominal mass palpated [Abnormal Walk] : normal gait [Involuntary Movements] : no involuntary movements were seen [Skin Color & Pigmentation] : normal skin color and pigmentation [] : no rash [No Focal Deficits] : no focal deficits [FreeTextEntry1] : aox3 [Impaired Insight] : insight and judgment were intact [Affect] : the affect was normal

## 2020-01-04 ENCOUNTER — OTHER (OUTPATIENT)
Age: 60
End: 2020-01-04

## 2020-01-04 LAB — UREA BREATH TEST QL: POSITIVE

## 2020-01-06 ENCOUNTER — OTHER (OUTPATIENT)
Age: 60
End: 2020-01-06

## 2020-01-17 ENCOUNTER — APPOINTMENT (OUTPATIENT)
Dept: ENDOCRINOLOGY | Facility: CLINIC | Age: 60
End: 2020-01-17
Payer: COMMERCIAL

## 2020-01-17 VITALS
HEART RATE: 78 BPM | OXYGEN SATURATION: 97 % | BODY MASS INDEX: 43.4 KG/M2 | DIASTOLIC BLOOD PRESSURE: 78 MMHG | HEIGHT: 63.5 IN | SYSTOLIC BLOOD PRESSURE: 130 MMHG | WEIGHT: 248 LBS

## 2020-01-17 LAB — HBA1C MFR BLD HPLC: 8.7

## 2020-01-17 PROCEDURE — 99214 OFFICE O/P EST MOD 30 MIN: CPT | Mod: 25

## 2020-01-17 PROCEDURE — 83036 HEMOGLOBIN GLYCOSYLATED A1C: CPT | Mod: QW

## 2020-01-17 NOTE — ASSESSMENT
[Carbohydrate Consistent Diet] : carbohydrate consistent diet [Hypoglycemia Management] : hypoglycemia management [Long Term Vascular Complications] : long term vascular complications of diabetes [Importance of Diet and Exercise] : importance of diet and exercise to improve glycemic control, achieve weight loss and improve cardiovascular health [Action and use of Insulin] : action and use of short and long-acting insulin [Self Monitoring of Blood Glucose] : self monitoring of blood glucose [Insulin Self-Administration] : insulin self-administration [Injection Technique, Storage, Sharps Disposal] : injection technique, storage, and sharps disposal [Retinopathy Screening] : Patient was referred to ophthalmology for retinopathy screening [Bisphosphonate Therapy] : Risks  and benefits of bisphosphonate therapy were  discussed with the patient including gastroesophageal irritation, osteonecrosis of the jaw, and atypical femur fractures, and acute phase reaction [FreeTextEntry1] : Patient is a 58 yo woman with uncontrolled T2DM, HLD and osteoporosis\par \par 1. Type 2 Diabetes\par -POCT A1c today is 8.7% slightly improved compared to September not yet at goal\par -she stopped using Alyssa and since has had hyperglycemia\par -add prandin with dinner\par -continue Lantus 60 daily + Trulicity 1.5 weekly and metformin\par -monofilament testing completed August 2019, next in August 2020\par -check microalbumin August 2020 as well\par -behavioral modifications encouraged\par -CDE visit in 1-2 months\par \par 2. HLD\par -statin \par -LDL goal < 70\par \par 3. Osteoporosis\par -check PTH and vitamin D levels. Replete D to 20-30\par -fall precautions\par -continue Boniva\par \par 4. Obesity\par -patient asks whether she has Cushing's Disease\par -no obvious stigmata of Cushing's however with weight and uncontrolled diabetes check 24 hour urine cortisol

## 2020-01-17 NOTE — HISTORY OF PRESENT ILLNESS
[FreeTextEntry1] : Patient is a 57 yo woman with NAFLD, ocular pemphigoid, type 2 diabetes and osteoporosis here for follow up\par \par Diabetes diagnosed in 2014. Has had prediabetes [id number]. She then developed her immune deficiency and required rituxan. With the rituxan she was getting decadron. Then with the decadron she developed diabetes. Has not had rituxan and has not had steroids since 2017. Has been on insulin since 2013. When she was doing the yvonne her sugars were at goal\par Lantus 60 units daily and metformin 1000 mg BID.   Tolerating trulicity 1.5 weekly at this time.  Victoza-nausea.\par Does not check sugars\par Breakfast: coffee, 2-3 times a week eggs or rye toast with butter; packet of Confucianist Oats\par Lunch: protein bar, banana\par Patient snacks less\par Dinner: chicken, broccoli, fish, half a yam. When going out to eat, had some empanadas and french fries\par During the holidays, "I eat all over the place" including salty and fried foods\par No sweets, occasional dark chocolate\par No soda, no juice\par Dilated eye exam: April 2019, no retinopathy\par A1c September 2019 9.2%\par \par Hx of uterine cancer s/p CHELSEA-BSO in 2016. Had menopause prior to surgery. Had screening bone density early 2000 and at the last visit. Osteoprosis in 1/3 radius with T score -2.9, L3-L4 T score -2.7\par No interval fractures\par Tolerating Boniva\par Last dental visit: offered treatment but she had dental implant done\par Does not drink milk, once in a while yogurt, not much dairy\par Vitamin D supplements\par

## 2020-01-17 NOTE — PHYSICAL EXAM
[Alert] : alert [No Acute Distress] : no acute distress [Well Nourished] : well nourished [Well Developed] : well developed [EOMI] : extra ocular movement intact [No Proptosis] : no proptosis [Normal Hearing] : hearing was normal [No Respiratory Distress] : no respiratory distress [Normal Rate and Effort] : normal respiratory rhythm and effort [No Accessory Muscle Use] : no accessory muscle use [Clear to Auscultation] : lungs were clear to auscultation bilaterally [Normal Rate] : heart rate was normal  [Normal S1, S2] : normal S1 and S2 [Regular Rhythm] : with a regular rhythm [Normal Bowel Sounds] : normal bowel sounds [Not Tender] : non-tender [Soft] : abdomen soft [No Stigmata of Cushings Syndrome] : no stigmata of cushings syndrome [Normal Gait] : normal gait [No Joint Swelling] : no joint swelling seen [No Motor Deficits] : the motor exam was normal [Normal Insight/Judgement] : insight and judgment were intact [Normal Mood] : the mood was normal [Normal Affect] : the affect was normal

## 2020-01-17 NOTE — REVIEW OF SYSTEMS
[Negative] : Eyes [All other systems negative] : All other systems negative [Fatigue] : no fatigue [Decreased Appetite] : appetite not decreased [Chest Pain] : no chest pain [Palpitations] : no palpitations [Heart Rate Is Slow] : the heart rate was not slow [Shortness Of Breath] : no shortness of breath [Wheezing] : no wheezing was heard [Cough] : no cough [SOB on Exertion] : no shortness of breath during exertion [Depression] : no depression [Anxiety] : no anxiety [Cold Intolerance] : cold tolerant [Heat Intolerance] : heat tolerant

## 2020-01-23 ENCOUNTER — RESULT CHARGE (OUTPATIENT)
Age: 60
End: 2020-01-23

## 2020-02-07 ENCOUNTER — APPOINTMENT (OUTPATIENT)
Dept: GYNECOLOGIC ONCOLOGY | Facility: CLINIC | Age: 60
End: 2020-02-07
Payer: COMMERCIAL

## 2020-02-07 VITALS
RESPIRATION RATE: 76 BRPM | DIASTOLIC BLOOD PRESSURE: 85 MMHG | HEIGHT: 63 IN | SYSTOLIC BLOOD PRESSURE: 138 MMHG | WEIGHT: 246 LBS | BODY MASS INDEX: 43.59 KG/M2

## 2020-02-07 PROCEDURE — 99213 OFFICE O/P EST LOW 20 MIN: CPT

## 2020-03-02 ENCOUNTER — LABORATORY RESULT (OUTPATIENT)
Age: 60
End: 2020-03-02

## 2020-03-06 ENCOUNTER — APPOINTMENT (OUTPATIENT)
Dept: GASTROENTEROLOGY | Facility: CLINIC | Age: 60
End: 2020-03-06
Payer: COMMERCIAL

## 2020-03-06 VITALS
DIASTOLIC BLOOD PRESSURE: 85 MMHG | SYSTOLIC BLOOD PRESSURE: 128 MMHG | WEIGHT: 245 LBS | OXYGEN SATURATION: 95 % | HEART RATE: 83 BPM | HEIGHT: 63 IN | TEMPERATURE: 98.2 F | RESPIRATION RATE: 18 BRPM | BODY MASS INDEX: 43.41 KG/M2

## 2020-03-06 DIAGNOSIS — A04.8 OTHER SPECIFIED BACTERIAL INTESTINAL INFECTIONS: ICD-10-CM

## 2020-03-06 DIAGNOSIS — Z87.19 PERSONAL HISTORY OF OTHER DISEASES OF THE DIGESTIVE SYSTEM: ICD-10-CM

## 2020-03-06 PROCEDURE — 99214 OFFICE O/P EST MOD 30 MIN: CPT

## 2020-03-09 PROBLEM — A04.8 H. PYLORI INFECTION: Status: ACTIVE | Noted: 2019-09-26

## 2020-03-09 NOTE — HISTORY OF PRESENT ILLNESS
[FreeTextEntry1] : Follow up: 12/2019\par Plan after last visit:\par H. 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 She is s/p HP treatment, needs confirmation of eradication. She is due for colonoscopy in 2022 for polyp surveillance. Her cough does not appear to be GERD related given negative bravo with no sx correlation. Would defer next steps to ENT/PCP. If no other causes seen, can consider 24 hour ph impedance to consider non-acidic reflux or non captured reflux on bravo. \par \par Impression:\par 1) HP Infection\par 2) Cough\par 3) Colon Polyps\par 4) Obesity c/b NAFLD\par 5) CVID\par 6) Ocular Pemphigoid\par 7) Constipation\par \par Plan:\par 1) HP breath test\par 2) Colon 2022\par 3) Miralax for Constipation\par 4) f/u ENT/PCP for cough\par 5) Referral to Dr. Cedeño for obesity medical care and evaluation\par 6) If rest of cough workup is negative, can consider 24 hour ph impedance down the road\par 7) All discussed with patient at length. All questions answered. Plan agreed upon\par 8) RV PRN to GI, should follow up with Kelin for NAFLD. \par -----------------------------------------------------------------------------------\par \par HP+\par s/p Quad therapy - did not clear\par Has PCN allergy\par \par Nausea - at baseline\par \par Altered bowel habits;\par Alternating between constipation and some looser stools (separate, soft pieces)\par Some incomplete BM\par No incontinence - stress or otherwise\par No blood in stool or melena\par \par NAFLD - due for f/u with Kelin BUTLER\par \par Colon Polyps - Due in 2022\par \par \par

## 2020-03-09 NOTE — ASSESSMENT
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 Needs re-treatment for HP after failure of eradication.  Will give trial of LQ triple therapy as per ACG guidelines.  Given altered bowel habits, would give trial of Metamucil.  Lastly - encouraged patient to f/u with Karen re: NAFLD.  \par \par Impression:\par 1) HP Infection - failed 1st treatment attempt (Quad Therapy)\par 2) Altered bowel habits\par 3) Colon Polyps\par 4) Obesity c/b NAFLD\par 5) CVID\par 6) Ocular Pemphigoid\par \par Plan:\par 1) Repeat treatment - LQ based triple therapy x 14 days\par 2) HP breath test in 10 weeks\par 3) Colon 2022\par 4) Start Metamucil for Altered bowel habits/IBS\par 5) All discussed with patient at length. All questions answered. Plan agreed upon\par 6) RV pending above\par 7) F/u Kelin BUTLER for NAFLD

## 2020-03-11 ENCOUNTER — APPOINTMENT (OUTPATIENT)
Dept: ENDOCRINOLOGY | Facility: CLINIC | Age: 60
End: 2020-03-11
Payer: COMMERCIAL

## 2020-03-11 PROCEDURE — G0108 DIAB MANAGE TRN  PER INDIV: CPT
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
## 2020-07-14 ENCOUNTER — RX RENEWAL (OUTPATIENT)
Age: 60
End: 2020-07-14

## 2020-07-16 ENCOUNTER — APPOINTMENT (OUTPATIENT)
Dept: ENDOCRINOLOGY | Facility: CLINIC | Age: 60
End: 2020-07-16
Payer: COMMERCIAL

## 2020-07-16 VITALS
DIASTOLIC BLOOD PRESSURE: 84 MMHG | BODY MASS INDEX: 44.3 KG/M2 | WEIGHT: 250 LBS | HEIGHT: 63 IN | SYSTOLIC BLOOD PRESSURE: 142 MMHG | HEART RATE: 72 BPM | TEMPERATURE: 97.6 F | OXYGEN SATURATION: 98 %

## 2020-07-16 DIAGNOSIS — M81.0 AGE-RELATED OSTEOPOROSIS W/OUT CURRENT PATHOLOGICAL FRACTURE: ICD-10-CM

## 2020-07-16 LAB — HBA1C MFR BLD HPLC: 8

## 2020-07-16 PROCEDURE — 83036 HEMOGLOBIN GLYCOSYLATED A1C: CPT | Mod: QW

## 2020-07-16 PROCEDURE — 99214 OFFICE O/P EST MOD 30 MIN: CPT | Mod: 25

## 2020-07-16 RX ORDER — REPAGLINIDE 1 MG/1
1 TABLET ORAL
Qty: 90 | Refills: 3 | Status: DISCONTINUED | COMMUNITY
Start: 2020-01-17 | End: 2020-07-16

## 2020-07-16 RX ORDER — SEMAGLUTIDE 1.34 MG/ML
2 INJECTION, SOLUTION SUBCUTANEOUS
Qty: 1 | Refills: 3 | Status: DISCONTINUED | COMMUNITY
Start: 2020-06-05 | End: 2020-07-16

## 2020-07-16 NOTE — PHYSICAL EXAM
[Well Nourished] : well nourished [Healthy Appearance] : healthy appearance [Alert] : alert [Normal Hearing] : hearing was normal [EOMI] : extra ocular movement intact [No Respiratory Distress] : no respiratory distress [No Accessory Muscle Use] : no accessory muscle use [Normal Rate and Effort] : normal respiratory rate and effort [Normal Rate] : heart rate was normal [No Motor Deficits] : the motor exam was normal [Normal Gait] : normal gait [Normal Mood] : the mood was normal [Normal Affect] : the affect was normal [Normal Insight/Judgement] : insight and judgment were intact [de-identified] : BMI  44

## 2020-07-16 NOTE — HISTORY OF PRESENT ILLNESS
[FreeTextEntry1] : Patient is a 58 yo woman with NAFLD, ocular pemphigoid, type 2 diabetes and osteoporosis here for follow up\par \par Diabetes diagnosed in 2014. Has had prediabetes 0176-7751. She then developed her immune deficiency and required rituxan. With the rituxan she was getting decadron. Then with the decadron she developed diabetes. Has not had rituxan and has not had steroids since 2017. Has been on insulin since 2013. When she was doing the yvonne her sugars were at goal\par Basaglar 60 units daily and metformin ER 1000 mg BID. Tolerating trulicity 1.5 weekly at this time. Victoza-nausea.  Attempted repaglinide and she developed headaches\par Patient aware of metformin ER recall\par Checks sugars on occasion, once a day\par SMBG ranges 160-250s\par Breakfast: coffee, 2-3 times a week eggs or rye toast with butter; packet of Mu-ism Oats\par Lunch: protein bar, banana\par Patient snacks less\par Dinner: baked potato with butter, chips and dip and turkey\par No sweets, occasional dark chocolate\par No soda, no juice\par Dilated eye exam: April 2019, due for a visit\par January A1c 8.7%\par Patient was given referral for rheumatology for evaluation of lupus vs. sarcoid\par \par Hx of uterine cancer s/p CHELSEA-BSO in 2016. Had menopause prior to surgery. Had screening bone density early 2000 and at the last visit. \par Osteoporosis in 1/3 radius with T score -2.9, L3-L4 T score -2.7\par No interval fractures\par Tolerating Boniva has been on medication for one year\par Last dental visit: doing invisalign, no plans for major dental work\par Does not drink milk, once in a while yogurt, not much dairy\par Vitamin D supplements\par \par

## 2020-07-16 NOTE — REVIEW OF SYSTEMS
[As Noted in HPI] : as noted in HPI [Dysphagia] : no dysphagia [Fatigue] : no fatigue [Decreased Appetite] : appetite not decreased [Chest Pain] : no chest pain [Dysphonia] : no dysphonia [Slow Heart Rate] : heart rate is not slow [Palpitations] : no palpitations [Fast Heart Rate] : heart rate is not fast [Shortness Of Breath] : no shortness of breath [Cough] : no cough [Nausea] : no nausea [Constipation] : no constipation [Vomiting] : no vomiting [Diarrhea] : no diarrhea [de-identified] : possible lupus

## 2020-07-16 NOTE — ASSESSMENT
[FreeTextEntry1] : Patient is a 58 yo woman with uncontrolled T2DM, HLD and osteoporosis\par \par 1. Type 2 Diabetes\par -POCT A1c today is 8.0% slightly improved, goal 7%\par -she stopped using Alyssa and since has had hyperglycemia\par -did not tolerate prandin\par -check sugars once a day at best\par -continue Lantus 60 daily + Trulicity 1.5 weekly and metformin ER 1000 mg BID, aware of recall\par -add glimepiride 2 mg BID as she feels that was the only medicine that got her sugars lower; hypoglycemia risk discussed\par -monofilament testing next visit\par -check microalbumin \par -behavioral modifications encouraged\par -CDE visit in 1-2 months for nutrition and also injection teaching as there is bruising around the trulicity injection sites\par -patient is getting work up for rheumatologic issue. Educated that if started on steroids, she will require close follow up as steroids will exacerbate hyperglycemia\par \par 2. HLD\par -statin \par -LDL goal < 70\par \par 3. Osteoporosis\par -check PTH and vitamin D levels. Replete D to 20-30\par -fall precautions\par \par 4. Obesity\par -AM cortisol was not elevated\par -will repeat\par -recommended checking a 24 hour urine cortisol but did not get done.\par -lab slips provided [Diabetes Foot Care] : diabetes foot care [Long Term Vascular Complications] : long term vascular complications of diabetes [Carbohydrate Consistent Diet] : carbohydrate consistent diet [Importance of Diet and Exercise] : importance of diet and exercise to improve glycemic control, achieve weight loss and improve cardiovascular health [Exercise/Effect on Glucose] : exercise/effect on glucose [Hypoglycemia Management] : hypoglycemia management [Action and use of Insulin] : action and use of short and long-acting insulin [Self Monitoring of Blood Glucose] : self monitoring of blood glucose [Retinopathy Screening] : Patient was referred to ophthalmology for retinopathy screening

## 2020-07-17 RX ORDER — LEVOFLOXACIN 500 MG/1
500 TABLET, FILM COATED ORAL DAILY
Qty: 14 | Refills: 0 | Status: DISCONTINUED | COMMUNITY
Start: 2020-03-06 | End: 2020-07-17

## 2020-08-05 ENCOUNTER — APPOINTMENT (OUTPATIENT)
Dept: ENDOCRINOLOGY | Facility: CLINIC | Age: 60
End: 2020-08-05
Payer: COMMERCIAL

## 2020-08-05 PROCEDURE — G0108 DIAB MANAGE TRN  PER INDIV: CPT

## 2020-08-06 ENCOUNTER — APPOINTMENT (OUTPATIENT)
Dept: ENDOCRINOLOGY | Facility: CLINIC | Age: 60
End: 2020-08-06

## 2020-08-18 ENCOUNTER — NON-APPOINTMENT (OUTPATIENT)
Age: 60
End: 2020-08-18

## 2020-08-20 ENCOUNTER — APPOINTMENT (OUTPATIENT)
Dept: RHEUMATOLOGY | Facility: CLINIC | Age: 60
End: 2020-08-20
Payer: COMMERCIAL

## 2020-08-20 VITALS
SYSTOLIC BLOOD PRESSURE: 133 MMHG | OXYGEN SATURATION: 98 % | WEIGHT: 251 LBS | BODY MASS INDEX: 44.47 KG/M2 | TEMPERATURE: 96.9 F | HEIGHT: 63 IN | HEART RATE: 80 BPM | DIASTOLIC BLOOD PRESSURE: 87 MMHG | RESPIRATION RATE: 16 BRPM

## 2020-08-20 DIAGNOSIS — Z82.0 FAMILY HISTORY OF EPILEPSY AND OTHER DISEASES OF THE NERVOUS SYSTEM: ICD-10-CM

## 2020-08-20 DIAGNOSIS — Z80.1 FAMILY HISTORY OF MALIGNANT NEOPLASM OF TRACHEA, BRONCHUS AND LUNG: ICD-10-CM

## 2020-08-20 PROCEDURE — 99205 OFFICE O/P NEW HI 60 MIN: CPT | Mod: 25

## 2020-08-20 PROCEDURE — 11105 PUNCH BX SKIN EA SEP/ADDL: CPT

## 2020-08-20 PROCEDURE — 11104 PUNCH BX SKIN SINGLE LESION: CPT

## 2020-08-20 NOTE — PHYSICAL EXAM
[Strabismus] : no strabismus was seen [Outer Ear] : the ears and nose were normal in appearance [Neck Appearance] : the appearance of the neck was normal [Neck Cervical Mass (___cm)] : no neck mass was observed [Oropharynx] : the oropharynx was normal [Thyroid Diffuse Enlargement] : the thyroid was not enlarged [Jugular Venous Distention Increased] : there was no jugular-venous distention [Auscultation Breath Sounds / Voice Sounds] : lungs were clear to auscultation bilaterally [Murmurs] : no murmurs [Heart Sounds] : normal S1 and S2 [Heart Sounds Gallop] : no gallops [Heart Rate And Rhythm] : heart rate was normal and rhythm regular [Edema] : there was no peripheral edema [Heart Sounds Pericardial Friction Rub] : no pericardial rub [Abdomen Soft] : soft [Bowel Sounds] : normal bowel sounds [Abdomen Tenderness] : non-tender [] : no hepato-splenomegaly [Cervical Lymph Nodes Enlarged Posterior Bilaterally] : posterior cervical [Abdomen Mass (___ Cm)] : no abdominal mass palpated [Cervical Lymph Nodes Enlarged Anterior Bilaterally] : anterior cervical [Supraclavicular Lymph Nodes Enlarged Bilaterally] : supraclavicular [No CVA Tenderness] : no ~M costovertebral angle tenderness [No Spinal Tenderness] : no spinal tenderness [Musculoskeletal - Swelling] : no joint swelling seen [Motor Tone] : muscle strength and tone were normal [Nail Clubbing] : no clubbing  or cyanosis of the fingernails [Abnormal Walk] : normal gait [Skin Color & Pigmentation] : normal skin color and pigmentation [Skin Turgor] : normal skin turgor [Sensation] : the sensory exam was normal to light touch and pinprick [Motor Exam] : the motor exam was normal [Oriented To Time, Place, And Person] : oriented to person, place, and time [Impaired Insight] : insight and judgment were intact [Affect] : the affect was normal [FreeTextEntry1] : several large annular skin lesions with pink borders and clear centers

## 2020-08-20 NOTE — HISTORY OF PRESENT ILLNESS
[FreeTextEntry1] : 1.  Dermatitis: Annular skin lesions abdomen and chest that started in 2019. A visit to the dermatologist led to a skin biopsy, but apparently no diagnosis was given. She has not had fever, arthritis, oral ulcers. \par 2.  Cough: chronic cough since 2018 for which she has been evaluated with CT and PFTs. After modification of her BP meds, the cough improved. \par 3.  CVID: diagnosed in 2018.  She has been receiving IVIG monthly.  The relationship of this diagnosis to prior receipt of rituximab in unclear. \par 4.  Ocular cicatricial pemphigoid: onset in 2008, diagnosed in 2013.  Therapy included rituximab and dexamethasone from 2301-1376. \par 5.  Sjogren's evaluation: lip biopsy negative according to the patient.\par 6.  Obesity: NAFLD\par 7.  Gastritis: H pylori positive; she was treated. \par 8.  Diabetes: on insulin and oral hypoglycemics\par 9.  Osteoporosis: Endo started Boniva in Feb 2020. \par 10.  Osteoarthritis\par \par Meds\par insulin\par metformin\par glimperide\par trulicity\par Lexapro\par Boniva\par IVIG

## 2020-08-20 NOTE — ASSESSMENT
[FreeTextEntry1] : 1.  Dermatitis: Annular skin lesions abdomen and chest that started in 2019. A visit to the dermatologist led to a skin biopsy, but apparently no diagnosis was given. She has not had fever, arthritis, oral ulcers. \par 2.  Cough: chronic cough since 2018 for which she has been evaluated with CT and PFTs. After modification of her BP meds, the cough improved. \par 3.  CVID: diagnosed in 2018.  She has been receiving IVIG monthly.  The relationship of this diagnosis to prior receipt of rituximab in unclear. \par 4.  Ocular cicatricial pemphigoid: onset in 2008, diagnosed in 2013.  Therapy included rituximab and dexamethasone from 4429-7845. \par 5.  Sjogren's evaluation: lip biopsy negative according to the patient.\par 6.  Obesity: NAFLD\par 7.  Gastritis: H pylori positive; she was treated. \par 8.  Diabetes: on insulin and oral hypoglycemics\par 9.  Osteoporosis: Endo started Boniva in Feb 2020. \par 10.  Osteoarthritis\par \par Plan:\par 1.  Lab tests\par 2.  Two skin biopsies (3 mm) from the right abdomen were performed (Hand E; IF)\par 3.  Return one week

## 2020-08-21 LAB
24R-OH-CALCIDIOL SERPL-MCNC: 94.7 PG/ML
25(OH)D3 SERPL-MCNC: 31 NG/ML
ALBUMIN SERPL ELPH-MCNC: 4.7 G/DL
ALP BLD-CCNC: 58 U/L
ALT SERPL-CCNC: 16 U/L
ANION GAP SERPL CALC-SCNC: 17 MMOL/L
AST SERPL-CCNC: 16 U/L
BASOPHILS # BLD AUTO: 0.07 K/UL
BASOPHILS NFR BLD AUTO: 0.9 %
BILIRUB SERPL-MCNC: 0.2 MG/DL
BUN SERPL-MCNC: 12 MG/DL
C3 SERPL-MCNC: 145 MG/DL
C4 SERPL-MCNC: 42 MG/DL
CALCIUM SERPL-MCNC: 9.9 MG/DL
CHLORIDE SERPL-SCNC: 104 MMOL/L
CK SERPL-CCNC: 94 U/L
CO2 SERPL-SCNC: 24 MMOL/L
CREAT SERPL-MCNC: 0.56 MG/DL
DSDNA AB SER-ACNC: <12 IU/ML
ENA RNP AB SER IA-ACNC: <0.2 AL
ENA SM AB SER IA-ACNC: <0.2 AL
ENA SS-A AB SER IA-ACNC: <0.2 AL
ENA SS-B AB SER IA-ACNC: <0.2 AL
EOSINOPHIL # BLD AUTO: 0.16 K/UL
EOSINOPHIL NFR BLD AUTO: 2.1 %
GLUCOSE SERPL-MCNC: 100 MG/DL
HCT VFR BLD CALC: 41.4 %
HGB BLD-MCNC: 12.7 G/DL
IMM GRANULOCYTES NFR BLD AUTO: 0.3 %
LYMPHOCYTES # BLD AUTO: 2.56 K/UL
LYMPHOCYTES NFR BLD AUTO: 33.9 %
MAN DIFF?: NORMAL
MCHC RBC-ENTMCNC: 27.3 PG
MCHC RBC-ENTMCNC: 30.7 GM/DL
MCV RBC AUTO: 89 FL
MONOCYTES # BLD AUTO: 0.7 K/UL
MONOCYTES NFR BLD AUTO: 9.3 %
NEUTROPHILS # BLD AUTO: 4.05 K/UL
NEUTROPHILS NFR BLD AUTO: 53.5 %
PLATELET # BLD AUTO: 328 K/UL
POTASSIUM SERPL-SCNC: 5.2 MMOL/L
PROT SERPL-MCNC: 6.4 G/DL
RBC # BLD: 4.65 M/UL
RBC # FLD: 13.6 %
SODIUM SERPL-SCNC: 144 MMOL/L
WBC # FLD AUTO: 7.56 K/UL

## 2020-08-24 LAB
A PHAGOCYTOPH IGG TITR SER IF: NORMAL TITER
ACE BLD-CCNC: 80 U/L
B BURGDOR AB SER QL IA: NEGATIVE
B MICROTI IGG TITR SER: NORMAL TITER
E CHAFFEENSIS IGG TITR SER IF: NORMAL TITER

## 2020-08-25 LAB
ANA PAT FLD IF-IMP: NORMAL
ANA SER IF-ACNC: ABNORMAL
CORE LAB BIOPSY: NORMAL

## 2020-09-01 ENCOUNTER — APPOINTMENT (OUTPATIENT)
Dept: RHEUMATOLOGY | Facility: CLINIC | Age: 60
End: 2020-09-01
Payer: COMMERCIAL

## 2020-09-01 VITALS
SYSTOLIC BLOOD PRESSURE: 118 MMHG | HEIGHT: 63 IN | DIASTOLIC BLOOD PRESSURE: 79 MMHG | BODY MASS INDEX: 44.3 KG/M2 | RESPIRATION RATE: 16 BRPM | WEIGHT: 250 LBS | TEMPERATURE: 97.1 F | HEART RATE: 78 BPM | OXYGEN SATURATION: 98 %

## 2020-09-01 PROCEDURE — 99213 OFFICE O/P EST LOW 20 MIN: CPT

## 2020-09-02 ENCOUNTER — APPOINTMENT (OUTPATIENT)
Dept: ULTRASOUND IMAGING | Facility: IMAGING CENTER | Age: 60
End: 2020-09-02
Payer: COMMERCIAL

## 2020-09-02 ENCOUNTER — APPOINTMENT (OUTPATIENT)
Dept: MAMMOGRAPHY | Facility: IMAGING CENTER | Age: 60
End: 2020-09-02
Payer: COMMERCIAL

## 2020-09-02 ENCOUNTER — OUTPATIENT (OUTPATIENT)
Dept: OUTPATIENT SERVICES | Facility: HOSPITAL | Age: 60
LOS: 1 days | End: 2020-09-02
Payer: COMMERCIAL

## 2020-09-02 DIAGNOSIS — Z98.890 OTHER SPECIFIED POSTPROCEDURAL STATES: Chronic | ICD-10-CM

## 2020-09-02 DIAGNOSIS — Z98.89 OTHER SPECIFIED POSTPROCEDURAL STATES: Chronic | ICD-10-CM

## 2020-09-02 DIAGNOSIS — Z00.8 ENCOUNTER FOR OTHER GENERAL EXAMINATION: ICD-10-CM

## 2020-09-02 DIAGNOSIS — Z90.710 ACQUIRED ABSENCE OF BOTH CERVIX AND UTERUS: Chronic | ICD-10-CM

## 2020-09-02 DIAGNOSIS — Z90.89 ACQUIRED ABSENCE OF OTHER ORGANS: Chronic | ICD-10-CM

## 2020-09-02 PROCEDURE — 77067 SCR MAMMO BI INCL CAD: CPT | Mod: 26

## 2020-09-02 PROCEDURE — 77063 BREAST TOMOSYNTHESIS BI: CPT | Mod: 26

## 2020-09-02 PROCEDURE — 76641 ULTRASOUND BREAST COMPLETE: CPT | Mod: 26,50

## 2020-09-02 PROCEDURE — 77067 SCR MAMMO BI INCL CAD: CPT

## 2020-09-02 PROCEDURE — 76641 ULTRASOUND BREAST COMPLETE: CPT

## 2020-09-02 PROCEDURE — 77063 BREAST TOMOSYNTHESIS BI: CPT

## 2020-09-29 ENCOUNTER — RX RENEWAL (OUTPATIENT)
Age: 60
End: 2020-09-29

## 2020-10-19 ENCOUNTER — APPOINTMENT (OUTPATIENT)
Dept: ENDOCRINOLOGY | Facility: CLINIC | Age: 60
End: 2020-10-19
Payer: COMMERCIAL

## 2020-10-19 ENCOUNTER — NON-APPOINTMENT (OUTPATIENT)
Age: 60
End: 2020-10-19

## 2020-10-19 VITALS
HEIGHT: 63 IN | TEMPERATURE: 98 F | WEIGHT: 244 LBS | BODY MASS INDEX: 43.23 KG/M2 | DIASTOLIC BLOOD PRESSURE: 80 MMHG | SYSTOLIC BLOOD PRESSURE: 120 MMHG

## 2020-10-19 LAB — GLUCOSE BLDC GLUCOMTR-MCNC: 187

## 2020-10-19 PROCEDURE — 99072 ADDL SUPL MATRL&STAF TM PHE: CPT

## 2020-10-19 PROCEDURE — 99214 OFFICE O/P EST MOD 30 MIN: CPT | Mod: 25

## 2020-10-19 NOTE — RESULTS/DATA
[Hologic] : hologic [L1 - L4] : L1 - L4 [BMD ___ g/cm2] : BMD: [unfilled] g/cm2 [T-Score ___] : T-score: [unfilled] [Z-Score ___] : Z-score: [unfilled] [FreeTextEntry2] : DAVID 2019

## 2020-10-19 NOTE — ASSESSMENT
[FreeTextEntry1] : Patient is a 58 yo woman with uncontrolled T2DM, HLD and osteoporosis presenting for endocrine follow up.  \par \par 1. Type 2 Diabetes\par -Point-of-care A1c is 8.5% today which has worsened.\par -did not tolerate prandin\par -check sugars once a day at best\par -continue Lantus 60 daily + Trulicity 1.5 weekly and metformin ER 1000 mg BID, aware of recall\par -Recommend to increase glimepiride from 2 mg once daily to 2 mg twice daily.\par -Patient will try to create an account with yvonne colon and share the data with us if not she will bring back to meter in 1 month for us to down all\par -monofilament testing done today within normal limits.\par -behavioral modifications encouraged\par -CDE visit in 1-2 months for nutrition and also injection teaching as there is bruising around the trulicity injection sites\par -patient is getting work up for rheumatologic issue. Educated that if started on steroids, she will require close follow up as steroids will exacerbate hyperglycemia\par \par 2. HLD\par -statin \par -LDL goal < 70\par \par 3. Osteoporosis\par -check PTH and vitamin D levels. Replete D to 20-30\par -fall precautions\par \par 4. Obesity\par -AM cortisol was not elevated\par -Continue to work on diet and exercise\par -May need to complete 24 hour cortisol which she didn't get a chance to do, however I do suspicions for Cushing's syndrome is very low in this patient. [Diabetes Foot Care] : diabetes foot care [Long Term Vascular Complications] : long term vascular complications of diabetes [Importance of Diet and Exercise] : importance of diet and exercise to improve glycemic control, achieve weight loss and improve cardiovascular health [Carbohydrate Consistent Diet] : carbohydrate consistent diet [Hypoglycemia Management] : hypoglycemia management [Exercise/Effect on Glucose] : exercise/effect on glucose [Glucagon Use] : glucagon use [Ketone Testing] : ketone testing [Insulin Self-Administration] : insulin self-administration [Self Monitoring of Blood Glucose] : self monitoring of blood glucose [Action and use of Insulin] : action and use of short and long-acting insulin [Injection Technique, Storage, Sharps Disposal] : injection technique, storage, and sharps disposal [Sick-Day Management] : sick-day management [Retinopathy Screening] : Patient was referred to ophthalmology for retinopathy screening

## 2020-10-19 NOTE — THERAPY
[Today's Date] : [unfilled] [Basaglar] : Basaglar [FreeTextEntry9] : 60 units at bedtime  [FreeTextEntry7] : TRULICITY 1.5MG ONCE WEEKLY \par METFORMIN ER 1000MG BID\par Glimepiride 2MG OCNE DAILY

## 2020-10-19 NOTE — HISTORY OF PRESENT ILLNESS
[FreeTextEntry1] : Patient is a 58 yo woman with NAFLD, ocular pemphigoid, type 2 diabetes and osteoporosis here for follow up\par \par Diabetes diagnosed in 2014. Has had prediabetes 5117-1385. She then developed her immune deficiency and required rituxan. With the rituxan she was getting decadron. Then with the decadron she developed diabetes. Has not had rituxan and has not had steroids since 2017. Has been on insulin since 2013. When she was doing the alyssa her sugars were at goal\par Basaglar 60 units daily\par Tolerating trulicity 1.5mg weekly at this time. \par Last visit Glimepiride 2mg twice daily (she has been taking only once daily)\par Metformin ER 1000mg twice daily.  \par \par Using Alyssa sensor at home version. Which she purchase out-of-pocket.  Unable to download the CGM today because she lost a meter.\par \par Breakfast: coffee, 2-3 times a week eggs or rye toast with butter; packet of Jehovah's witness Oats\par Lunch: protein bar, banana\par Patient snacks less\par Dinner: baked potato with butter, chips and dip and turkey\par No sweets, occasional dark chocolate\par No soda, no juice\par She is not up-to-date with her ophthalmologist, foot exam done today within normal limits.\par \par Hx of uterine cancer s/p CHELSEA-BSO in 2016. Had menopause prior to surgery. Had screening bone density early 2000 and at the last visit.  She had a bone density in Aug 2019: Osteoporosis in 1/3 radius with T score -2.9, L3-L4 T score -2.7. No interval fractures\par Tolerating Boniva has been on medication for one year\par \par Last dental visit: doing invisalign, no plans for major dental work\par \par Does not drink milk, once in a while yogurt, not much dairy.  Vitamin D supplements which she is taking.  \par \par Regarding HLD, she takes atorvastatin 40mg daily, no myalgia. \par \par Regarding blood pressure, she stopped taking Benicar due to a chronic cough.

## 2020-10-19 NOTE — PHYSICAL EXAM
[Alert] : alert [Well Nourished] : well nourished [No Acute Distress] : no acute distress [Well Developed] : well developed [Normal Sclera/Conjunctiva] : normal sclera/conjunctiva [EOMI] : extra ocular movement intact [No Proptosis] : no proptosis [Normal Oropharynx] : the oropharynx was normal [Thyroid Not Enlarged] : the thyroid was not enlarged [No Thyroid Nodules] : no palpable thyroid nodules [No Respiratory Distress] : no respiratory distress [No Accessory Muscle Use] : no accessory muscle use [Clear to Auscultation] : lungs were clear to auscultation bilaterally [Normal S1, S2] : normal S1 and S2 [Normal Rate] : heart rate was normal [Regular Rhythm] : with a regular rhythm [No Edema] : no peripheral edema [Pedal Pulses Normal] : the pedal pulses are present [Normal Bowel Sounds] : normal bowel sounds [Not Tender] : non-tender [Not Distended] : not distended [Soft] : abdomen soft [Normal Anterior Cervical Nodes] : no anterior cervical lymphadenopathy [Normal Posterior Cervical Nodes] : no posterior cervical lymphadenopathy [No Spinal Tenderness] : no spinal tenderness [No Stigmata of Cushings Syndrome] : no stigmata of Cushings Syndrome [Spine Straight] : spine straight [Normal Strength/Tone] : muscle strength and tone were normal [Normal Gait] : normal gait [No Rash] : no rash [Normal Reflexes] : deep tendon reflexes were 2+ and symmetric [No Tremors] : no tremors [Oriented x3] : oriented to person, place, and time [Acanthosis Nigricans] : no acanthosis nigricans [Normal] : normal [Diminished Throughout Both Feet] : normal tactile sensation with monofilament testing throughout both feet [Full ROM] : with full range of motion

## 2020-10-30 LAB
CALCIUM SERPL-MCNC: 9.8 MG/DL
CHOLEST SERPL-MCNC: 164 MG/DL
CHOLEST/HDLC SERPL: 3.6 RATIO
CREAT SPEC-SCNC: 64 MG/DL
HDLC SERPL-MCNC: 46 MG/DL
LDLC SERPL CALC-MCNC: 87 MG/DL
MICROALBUMIN 24H UR DL<=1MG/L-MCNC: 1.3 MG/DL
MICROALBUMIN/CREAT 24H UR-RTO: 21 MG/G
PARATHYROID HORMONE INTACT: 56 PG/ML
PHOSPHATE SERPL-MCNC: 2.6 MG/DL
TRIGL SERPL-MCNC: 156 MG/DL

## 2020-11-18 LAB — UREA BREATH TEST QL: POSITIVE

## 2020-11-20 ENCOUNTER — NON-APPOINTMENT (OUTPATIENT)
Age: 60
End: 2020-11-20

## 2020-11-23 ENCOUNTER — APPOINTMENT (OUTPATIENT)
Dept: RHEUMATOLOGY | Facility: CLINIC | Age: 60
End: 2020-11-23
Payer: COMMERCIAL

## 2020-11-23 ENCOUNTER — NON-APPOINTMENT (OUTPATIENT)
Age: 60
End: 2020-11-23

## 2020-11-23 ENCOUNTER — APPOINTMENT (OUTPATIENT)
Dept: GASTROENTEROLOGY | Facility: CLINIC | Age: 60
End: 2020-11-23
Payer: COMMERCIAL

## 2020-11-23 VITALS
DIASTOLIC BLOOD PRESSURE: 79 MMHG | OXYGEN SATURATION: 98 % | SYSTOLIC BLOOD PRESSURE: 124 MMHG | RESPIRATION RATE: 16 BRPM | TEMPERATURE: 96.4 F | WEIGHT: 245 LBS | BODY MASS INDEX: 43.41 KG/M2 | HEIGHT: 63 IN | HEART RATE: 82 BPM

## 2020-11-23 DIAGNOSIS — L94.0 LOCALIZED SCLERODERMA [MORPHEA]: ICD-10-CM

## 2020-11-23 PROCEDURE — 99213 OFFICE O/P EST LOW 20 MIN: CPT

## 2020-11-23 PROCEDURE — 99442: CPT

## 2020-12-01 ENCOUNTER — APPOINTMENT (OUTPATIENT)
Dept: GASTROENTEROLOGY | Facility: CLINIC | Age: 60
End: 2020-12-01

## 2020-12-05 DIAGNOSIS — Z01.818 ENCOUNTER FOR OTHER PREPROCEDURAL EXAMINATION: ICD-10-CM

## 2020-12-06 ENCOUNTER — APPOINTMENT (OUTPATIENT)
Dept: DISASTER EMERGENCY | Facility: CLINIC | Age: 60
End: 2020-12-06

## 2020-12-07 LAB — SARS-COV-2 N GENE NPH QL NAA+PROBE: NOT DETECTED

## 2020-12-09 ENCOUNTER — OUTPATIENT (OUTPATIENT)
Dept: OUTPATIENT SERVICES | Facility: HOSPITAL | Age: 60
LOS: 1 days | Discharge: HOME | End: 2020-12-09
Payer: COMMERCIAL

## 2020-12-09 ENCOUNTER — APPOINTMENT (OUTPATIENT)
Dept: GASTROENTEROLOGY | Facility: HOSPITAL | Age: 60
End: 2020-12-09

## 2020-12-09 VITALS
DIASTOLIC BLOOD PRESSURE: 94 MMHG | HEART RATE: 78 BPM | RESPIRATION RATE: 20 BRPM | OXYGEN SATURATION: 98 % | SYSTOLIC BLOOD PRESSURE: 140 MMHG

## 2020-12-09 VITALS
RESPIRATION RATE: 19 BRPM | HEIGHT: 64 IN | HEART RATE: 66 BPM | DIASTOLIC BLOOD PRESSURE: 79 MMHG | SYSTOLIC BLOOD PRESSURE: 127 MMHG | OXYGEN SATURATION: 97 % | TEMPERATURE: 98 F | WEIGHT: 240.08 LBS

## 2020-12-09 DIAGNOSIS — Z98.89 OTHER SPECIFIED POSTPROCEDURAL STATES: Chronic | ICD-10-CM

## 2020-12-09 DIAGNOSIS — Z90.89 ACQUIRED ABSENCE OF OTHER ORGANS: Chronic | ICD-10-CM

## 2020-12-09 DIAGNOSIS — Z98.890 OTHER SPECIFIED POSTPROCEDURAL STATES: Chronic | ICD-10-CM

## 2020-12-09 DIAGNOSIS — A04.8 OTHER SPECIFIED BACTERIAL INTESTINAL INFECTIONS: ICD-10-CM

## 2020-12-09 DIAGNOSIS — Z90.710 ACQUIRED ABSENCE OF BOTH CERVIX AND UTERUS: Chronic | ICD-10-CM

## 2020-12-09 LAB — GLUCOSE BLDC GLUCOMTR-MCNC: 297 MG/DL — HIGH (ref 70–99)

## 2020-12-09 PROCEDURE — 82962 GLUCOSE BLOOD TEST: CPT

## 2020-12-09 PROCEDURE — 43235 EGD DIAGNOSTIC BRUSH WASH: CPT | Mod: 52

## 2020-12-09 PROCEDURE — 43235 EGD DIAGNOSTIC BRUSH WASH: CPT

## 2020-12-09 RX ORDER — DULAGLUTIDE 4.5 MG/.5ML
1 INJECTION, SOLUTION SUBCUTANEOUS
Qty: 0 | Refills: 0 | DISCHARGE

## 2020-12-09 RX ORDER — SODIUM CHLORIDE 9 MG/ML
1000 INJECTION INTRAMUSCULAR; INTRAVENOUS; SUBCUTANEOUS
Refills: 0 | Status: DISCONTINUED | OUTPATIENT
Start: 2020-12-09 | End: 2020-12-23

## 2020-12-09 RX ORDER — ALBUTEROL 90 UG/1
2 AEROSOL, METERED ORAL
Qty: 0 | Refills: 0 | DISCHARGE

## 2020-12-09 RX ADMIN — SODIUM CHLORIDE 30 MILLILITER(S): 9 INJECTION INTRAMUSCULAR; INTRAVENOUS; SUBCUTANEOUS at 10:45

## 2020-12-09 NOTE — ASU PATIENT PROFILE, ADULT - PMH
Acute UTI  8/2019 - treated with oral antibiotics, symptoms now resolved  Asthma, allergic  triggered by environmental factors - perfume, paint, incense  Common variable immunodeficiency  IVIG monthly - last received in July 2019, due 9/26/2019  Depression  on lexapro  Dyslipidemia    History of endometrial cancer  stage 1 per pt - s/p [facility]-BSO 2016  Hypertension  no current medications  Morbid obesity  BMI=43.8  NAFLD (nonalcoholic fatty liver disease)    Osteoporosis    Type 2 diabetes mellitus  Hemoglobin A1C on 8/29/2019=8.6

## 2020-12-09 NOTE — PRE-ANESTHESIA EVALUATION ADULT - NSANTHOSAYNRD_GEN_A_CORE
No. IVORY screening performed.  STOP BANG Legend: 0-2 = LOW Risk; 3-4 = INTERMEDIATE Risk; 5-8 = HIGH Risk

## 2020-12-09 NOTE — ASU PATIENT PROFILE, ADULT - PSH
H/O lithotripsy    History of ovarian cystectomy  1979 - dermoid cyst  History of tonsillectomy    S/P colonoscopy with polypectomy  approximately 4 years ago, per pt  S/P [facility]-BSO  2016

## 2020-12-09 NOTE — PRE PROCEDURE NOTE - PRE PROCEDURE EVALUATION
Attending Physician:              Jere Cantu MD MSEd    Indication for Procedure          H Pylori      PAST MEDICAL & SURGICAL HISTORY:  History of endometrial cancer  stage 1 per pt - s/p [facility]-BSO 2016    Acute UTI  8/2019 - treated with oral antibiotics, symptoms now resolved    Asthma, allergic  triggered by environmental factors - perfume, paint, incense    Common variable immunodeficiency  IVIG monthly - last received in July 2019, due 9/26/2019    NAFLD (nonalcoholic fatty liver disease)    Osteoporosis    Depression  on lexapro    Morbid obesity  BMI=43.8    Hypertension  no current medications    Type 2 diabetes mellitus  Hemoglobin A1C on 8/29/2019=8.6    Dyslipidemia    S/P colonoscopy with polypectomy  approximately 4 years ago, per pt    History of tonsillectomy    H/O lithotripsy    S/P [facility]-BSO  2016    History of ovarian cystectomy  1979 - dermoid cyst          See Allscripts Note for further details  ALLERGIES:  Environmental - Paint/Perfume/Incense - Coughing (Other)  penicillin G benzathine (Hives)    HOME MEDICATIONS:  Lantus: 60 unit(s) subcutaneous once a day  Lexapro 10 mg oral tablet: 1 tab(s) orally once a day  Lipitor 40 mg oral tablet: 1 tab(s) orally once a day (at bedtime)  metformin 1000 mg oral tablet: 1 tab(s) orally 2 times a day  Trulicity Pen 1.5 mg/0.5 mL subcutaneous solution: 1 dose(s) subcutaneous once a week      See Allscripts Note for further details    AICD/PPM: [ ] yes   [ x] no    PERTINENT LAB DATA:                      PHYSICAL EXAMINATION:    T(C): --  HR: --  BP: --  RR: --  SpO2: --    Constitutional: NAD  HEENT: PERRLA, EOMI,    Neck:  No JVD  Respiratory: CTAB/L  Cardiovascular: S1 and S2  Gastrointestinal: BS+, soft, NT/ND  Extremities: No peripheral edema  Neurological: A/O x 3, no focal deficits  Psychiatric: Normal mood, normal affect  Skin: No rashes    ASA Class: I [ ]  II [ ]  III [x ]  IV [ ]    COMMENTS:    The patient is a suitable candidate for the planned procedure unless box checked [ ]  No, explain:

## 2020-12-10 ENCOUNTER — RX RENEWAL (OUTPATIENT)
Age: 60
End: 2020-12-10

## 2020-12-27 ENCOUNTER — APPOINTMENT (OUTPATIENT)
Dept: DISASTER EMERGENCY | Facility: CLINIC | Age: 60
End: 2020-12-27

## 2020-12-28 LAB — SARS-COV-2 N GENE NPH QL NAA+PROBE: NOT DETECTED

## 2020-12-30 ENCOUNTER — OUTPATIENT (OUTPATIENT)
Dept: OUTPATIENT SERVICES | Facility: HOSPITAL | Age: 60
LOS: 1 days | End: 2020-12-30
Payer: COMMERCIAL

## 2020-12-30 ENCOUNTER — RESULT REVIEW (OUTPATIENT)
Age: 60
End: 2020-12-30

## 2020-12-30 ENCOUNTER — APPOINTMENT (OUTPATIENT)
Dept: GASTROENTEROLOGY | Facility: HOSPITAL | Age: 60
End: 2020-12-30

## 2020-12-30 VITALS
WEIGHT: 238.1 LBS | SYSTOLIC BLOOD PRESSURE: 134 MMHG | HEART RATE: 68 BPM | OXYGEN SATURATION: 99 % | TEMPERATURE: 97 F | RESPIRATION RATE: 18 BRPM | DIASTOLIC BLOOD PRESSURE: 78 MMHG | HEIGHT: 64 IN

## 2020-12-30 VITALS
SYSTOLIC BLOOD PRESSURE: 133 MMHG | OXYGEN SATURATION: 98 % | DIASTOLIC BLOOD PRESSURE: 76 MMHG | HEART RATE: 74 BPM | RESPIRATION RATE: 18 BRPM

## 2020-12-30 DIAGNOSIS — Z98.890 OTHER SPECIFIED POSTPROCEDURAL STATES: Chronic | ICD-10-CM

## 2020-12-30 DIAGNOSIS — Z90.89 ACQUIRED ABSENCE OF OTHER ORGANS: Chronic | ICD-10-CM

## 2020-12-30 DIAGNOSIS — A04.8 OTHER SPECIFIED BACTERIAL INTESTINAL INFECTIONS: ICD-10-CM

## 2020-12-30 DIAGNOSIS — Z90.710 ACQUIRED ABSENCE OF BOTH CERVIX AND UTERUS: Chronic | ICD-10-CM

## 2020-12-30 DIAGNOSIS — Z98.89 OTHER SPECIFIED POSTPROCEDURAL STATES: Chronic | ICD-10-CM

## 2020-12-30 LAB — GLUCOSE BLDC GLUCOMTR-MCNC: 228 MG/DL — HIGH (ref 70–99)

## 2020-12-30 PROCEDURE — 88305 TISSUE EXAM BY PATHOLOGIST: CPT

## 2020-12-30 PROCEDURE — 88305 TISSUE EXAM BY PATHOLOGIST: CPT | Mod: 26

## 2020-12-30 PROCEDURE — 87081 CULTURE SCREEN ONLY: CPT

## 2020-12-30 PROCEDURE — 88342 IMHCHEM/IMCYTCHM 1ST ANTB: CPT | Mod: 26

## 2020-12-30 PROCEDURE — 43239 EGD BIOPSY SINGLE/MULTIPLE: CPT

## 2020-12-30 PROCEDURE — 88342 IMHCHEM/IMCYTCHM 1ST ANTB: CPT

## 2020-12-30 PROCEDURE — 88312 SPECIAL STAINS GROUP 1: CPT

## 2020-12-30 PROCEDURE — 82962 GLUCOSE BLOOD TEST: CPT

## 2020-12-30 PROCEDURE — 88312 SPECIAL STAINS GROUP 1: CPT | Mod: 26

## 2020-12-30 PROCEDURE — 87205 SMEAR GRAM STAIN: CPT

## 2020-12-30 RX ORDER — SODIUM CHLORIDE 9 MG/ML
1000 INJECTION INTRAMUSCULAR; INTRAVENOUS; SUBCUTANEOUS
Refills: 0 | Status: DISCONTINUED | OUTPATIENT
Start: 2020-12-30 | End: 2021-01-13

## 2020-12-30 NOTE — PRE PROCEDURE NOTE - PRE PROCEDURE EVALUATION
Attending Physician:              Jere Cantu MD MSEd    Indication for Procedure          HP+      PAST MEDICAL & SURGICAL HISTORY:  History of endometrial cancer  stage 1 per pt - s/p [facility]-BSO 2016    Acute UTI  8/2019 - treated with oral antibiotics, symptoms now resolved    Asthma, allergic  triggered by environmental factors - perfume, paint, incense    Common variable immunodeficiency  IVIG monthly - last received in July 2019, due 9/26/2019    NAFLD (nonalcoholic fatty liver disease)    Osteoporosis    Depression  on lexapro    Morbid obesity  BMI=43.8    Hypertension  no current medications    Type 2 diabetes mellitus  Hemoglobin A1C on 8/29/2019=8.6    Dyslipidemia    S/P colonoscopy with polypectomy  approximately 4 years ago, per pt    History of tonsillectomy    H/O lithotripsy    S/P [facility]-BSO  2016    History of ovarian cystectomy  1979 - dermoid cyst          See Allscripts Note for further details  ALLERGIES:  Environmental - Paint/Perfume/Incense - Coughing (Other)  penicillin G benzathine (Hives)    HOME MEDICATIONS:  Lantus: 60 unit(s) subcutaneous once a day  Lexapro 10 mg oral tablet: 1 tab(s) orally once a day  Lipitor 40 mg oral tablet: 1 tab(s) orally once a day (at bedtime)  metformin 1000 mg oral tablet: 1 tab(s) orally 2 times a day  Trulicity Pen 1.5 mg/0.5 mL subcutaneous solution: 1 dose(s) subcutaneous once a week      See Allscripts Note for further details    AICD/PPM: [ ] yes   [ x] no    PERTINENT LAB DATA:                      PHYSICAL EXAMINATION:    Height (cm): 162.6  Weight (kg): 108  BMI (kg/m2): 40.8  BSA (m2): 2.11T(C): --  HR: --  BP: --  RR: --  SpO2: --    Constitutional: NAD  HEENT: PERRLA, EOMI,    Neck:  No JVD  Respiratory: CTAB/L  Cardiovascular: S1 and S2  Gastrointestinal: BS+, soft, NT/ND  Extremities: No peripheral edema  Neurological: A/O x 3, no focal deficits  Psychiatric: Normal mood, normal affect  Skin: No rashes    ASA Class: I [ ]  II [ ]  III [ x]  IV [ ]    COMMENTS:    The patient is a suitable candidate for the planned procedure unless box checked [ ]  No, explain:

## 2021-01-06 LAB — SURGICAL PATHOLOGY STUDY: SIGNIFICANT CHANGE UP

## 2021-01-15 ENCOUNTER — APPOINTMENT (OUTPATIENT)
Dept: BARIATRICS | Facility: CLINIC | Age: 61
End: 2021-01-15

## 2021-02-05 ENCOUNTER — NON-APPOINTMENT (OUTPATIENT)
Age: 61
End: 2021-02-05

## 2021-02-08 ENCOUNTER — APPOINTMENT (OUTPATIENT)
Dept: GYNECOLOGIC ONCOLOGY | Facility: CLINIC | Age: 61
End: 2021-02-08
Payer: COMMERCIAL

## 2021-02-08 VITALS — HEIGHT: 63 IN | DIASTOLIC BLOOD PRESSURE: 81 MMHG | HEART RATE: 81 BPM | SYSTOLIC BLOOD PRESSURE: 126 MMHG

## 2021-02-08 VITALS — WEIGHT: 235 LBS | BODY MASS INDEX: 41.63 KG/M2

## 2021-02-08 PROCEDURE — 99213 OFFICE O/P EST LOW 20 MIN: CPT

## 2021-02-08 PROCEDURE — 99072 ADDL SUPL MATRL&STAF TM PHE: CPT

## 2021-02-08 RX ORDER — TETRACYCLINE HYDROCHLORIDE 500 MG/1
500 CAPSULE ORAL
Qty: 56 | Refills: 0 | Status: DISCONTINUED | COMMUNITY
Start: 2019-10-04 | End: 2021-02-08

## 2021-02-08 RX ORDER — BISMUTH SUBSALICYLATE 262 MG/1
262 TABLET, CHEWABLE ORAL
Qty: 112 | Refills: 0 | Status: DISCONTINUED | COMMUNITY
Start: 2019-10-04 | End: 2021-02-08

## 2021-02-08 RX ORDER — INSULIN GLARGINE 100 [IU]/ML
100 INJECTION, SOLUTION SUBCUTANEOUS
Qty: 3 | Refills: 3 | Status: DISCONTINUED | COMMUNITY
Start: 2018-03-15 | End: 2021-02-08

## 2021-02-08 RX ORDER — METRONIDAZOLE 500 MG/1
500 TABLET ORAL
Qty: 42 | Refills: 0 | Status: DISCONTINUED | COMMUNITY
Start: 2019-09-26 | End: 2021-02-08

## 2021-02-08 RX ORDER — METRONIDAZOLE 500 MG/1
500 TABLET ORAL 3 TIMES DAILY
Qty: 42 | Refills: 0 | Status: DISCONTINUED | COMMUNITY
Start: 2020-03-06 | End: 2021-02-08

## 2021-02-08 RX ORDER — PSYLLIUM SEED
48.57 PACKET (EA) ORAL
Qty: 1 | Refills: 2 | Status: DISCONTINUED | COMMUNITY
Start: 2020-03-06 | End: 2021-02-08

## 2021-02-08 RX ORDER — POLYETHYLENE GLYCOL 3350, SODIUM SULFATE ANHYDROUS, SODIUM BICARBONATE, SODIUM CHLORIDE, POTASSIUM CHLORIDE 227.1; 21.5; 6.36; 5.53; .754 G/L; G/L; G/L; G/L; G/L
227.1 POWDER, FOR SOLUTION ORAL
Qty: 1 | Refills: 0 | Status: DISCONTINUED | COMMUNITY
Start: 2019-06-25 | End: 2021-02-08

## 2021-02-08 RX ORDER — OMEPRAZOLE 20 MG/1
20 CAPSULE, DELAYED RELEASE ORAL
Qty: 28 | Refills: 0 | Status: DISCONTINUED | COMMUNITY
Start: 2019-09-26 | End: 2021-02-08

## 2021-02-09 ENCOUNTER — NON-APPOINTMENT (OUTPATIENT)
Age: 61
End: 2021-02-09

## 2021-02-12 ENCOUNTER — APPOINTMENT (OUTPATIENT)
Dept: ENDOCRINOLOGY | Facility: CLINIC | Age: 61
End: 2021-02-12

## 2021-02-22 ENCOUNTER — NON-APPOINTMENT (OUTPATIENT)
Age: 61
End: 2021-02-22

## 2021-03-08 ENCOUNTER — APPOINTMENT (OUTPATIENT)
Dept: INFECTIOUS DISEASE | Facility: CLINIC | Age: 61
End: 2021-03-08
Payer: COMMERCIAL

## 2021-03-08 ENCOUNTER — LABORATORY RESULT (OUTPATIENT)
Age: 61
End: 2021-03-08

## 2021-03-08 VITALS
HEIGHT: 64 IN | WEIGHT: 240 LBS | DIASTOLIC BLOOD PRESSURE: 86 MMHG | OXYGEN SATURATION: 96 % | HEART RATE: 76 BPM | BODY MASS INDEX: 40.97 KG/M2 | RESPIRATION RATE: 16 BRPM | TEMPERATURE: 98.2 F | SYSTOLIC BLOOD PRESSURE: 148 MMHG

## 2021-03-08 PROCEDURE — 99072 ADDL SUPL MATRL&STAF TM PHE: CPT

## 2021-03-08 PROCEDURE — 99203 OFFICE O/P NEW LOW 30 MIN: CPT

## 2021-03-08 RX ORDER — ALBUTEROL SULFATE 90 UG/1
108 (90 BASE) AEROSOL, METERED RESPIRATORY (INHALATION)
Qty: 18 | Refills: 0 | Status: DISCONTINUED | COMMUNITY
Start: 2018-04-03 | End: 2021-03-08

## 2021-03-08 RX ORDER — BETAMETHASONE DIPROPIONATE 0.5 MG/G
0.05 OINTMENT, AUGMENTED TOPICAL
Qty: 50 | Refills: 0 | Status: COMPLETED | COMMUNITY
Start: 2020-10-28

## 2021-03-08 RX ORDER — ALPRAZOLAM 0.25 MG/1
0.25 TABLET ORAL
Qty: 90 | Refills: 0 | Status: COMPLETED | COMMUNITY
Start: 2020-12-15

## 2021-03-08 RX ORDER — LANSOPRAZOLE 30 MG/1
30 CAPSULE, DELAYED RELEASE ORAL
Qty: 28 | Refills: 0 | Status: DISCONTINUED | COMMUNITY
Start: 2019-10-03 | End: 2021-03-08

## 2021-03-08 RX ORDER — OMEPRAZOLE 20 MG/1
20 CAPSULE, DELAYED RELEASE ORAL TWICE DAILY
Qty: 28 | Refills: 0 | Status: DISCONTINUED | COMMUNITY
Start: 2020-03-06 | End: 2021-03-08

## 2021-03-08 RX ORDER — IPRATROPIUM BROMIDE 42 UG/1
0.06 SPRAY NASAL
Qty: 15 | Refills: 0 | Status: COMPLETED | COMMUNITY
Start: 2020-12-02

## 2021-03-08 NOTE — HISTORY OF PRESENT ILLNESS
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 Pylori.\par \par Treated with Bismuth quadruple therapy first, followed by levaquin triple therapy for persistent positive disease. \par Cultures were sent out for sensi but culture did not grow.\par \par She has a remote allergy to penicillin dating back to 1998 and recalls hives as the reaction.\par \par She has some bloating. \par Also notes chronic annular, ring like rash on abdomen and chest and back. Unclear diagnosis.\par Lyme was negative. \par She has been to dermatology. Tried steroid injections and cream w/o improvement.\par Has been to rheumatology as well for the rash. Has positive BELKIS of unclear etiology. Currently observing patient. \par Denies fevers.\par \par Born in [place]. Moved to [place] as a child and then to [place].\par No recent travel.\par

## 2021-03-08 NOTE — CONSULT LETTER
[Dear  ___] : Dear  [unfilled], [Consult Letter:] : I had the pleasure of evaluating your patient, [unfilled]. [Please see my note below.] : Please see my note below. [Consult Closing:] : Thank you very much for allowing me to participate in the care of this patient.  If you have any questions, please do not hesitate to contact me. [Sincerely,] : Sincerely, [FreeTextEntry2] : Dr. Jere Cantu [FreeTextEntry3] : \par Yasmin Gracia MD\par  of Medicine\par Division of Infectious Diseases\par [facility]\par [address] [place] [address] [address]\[place] [address]\par Tel: [phone]\par Fax: [phone]

## 2021-03-08 NOTE — PHYSICAL EXAM
[General Appearance - Alert] : alert [General Appearance - In No Acute Distress] : in no acute distress [General Appearance - Well Nourished] : well nourished [General Appearance - Well-Appearing] : healthy appearing [Sclera] : the sclera and conjunctiva were normal [Extraocular Movements] : extraocular movements were intact [Outer Ear] : the ears and nose were normal in appearance [Neck Appearance] : the appearance of the neck was normal [] : no respiratory distress [Auscultation Breath Sounds / Voice Sounds] : lungs were clear to auscultation bilaterally [Heart Rate And Rhythm] : heart rate was normal and rhythm regular [Heart Sounds] : normal S1 and S2 [Heart Sounds Gallop] : no gallops [Murmurs] : no murmurs [Heart Sounds Pericardial Friction Rub] : no pericardial rub [Bowel Sounds] : normal bowel sounds [Abdomen Soft] : soft [Abdomen Tenderness] : non-tender [Costovertebral Angle Tenderness] : no CVA tenderness [No Palpable Adenopathy] : no palpable adenopathy [Musculoskeletal - Swelling] : no joint swelling [Nail Clubbing] : no clubbing  or cyanosis of the fingernails [Motor Tone] : muscle strength and tone were normal [FreeTextEntry1] : annular rash on abdomen noted, low back, and upper left chest.  [No Focal Deficits] : no focal deficits [Oriented To Time, Place, And Person] : oriented to person, place, and time [Affect] : the affect was normal

## 2021-03-08 NOTE — REVIEW OF SYSTEMS
[Diarrhea] : no diarrhea [Joint Pain] : joint pain [Skin Lesions] : skin lesion [Negative] : Heme/Lymph [FreeTextEntry7] : bloating [FreeTextEntry9] : right knee and ankle pain

## 2021-03-08 NOTE — ASSESSMENT
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 Pylori.\par \par Already treated with Bismuth quadruple therapy and levaquin triple therapy (with doxy, not amoxicillin).\par Refractory H. pylori.\par \par Will refer pt to allergy for PCN skin testing.\par \par IF not allergic, will consider rifabutin 300 mg po diali, amoxicillin 1 gram po bid, pmeprazole 20 mg po bid x 10 days. \par \par Will check CBC w/ diff, CMP for antibiotic dosing. Check repeat Lyme for pt.\par \par Rash - refer to [facility]. Pt interested in another opinion from dermatology.\par \par Called Dr. Cantu to discuss case.\par Reviewed allscripts results - path, labs, and notes from other providers.\par \par

## 2021-03-09 ENCOUNTER — TRANSCRIPTION ENCOUNTER (OUTPATIENT)
Age: 61
End: 2021-03-09

## 2021-03-09 LAB
ALBUMIN SERPL ELPH-MCNC: 4.6 G/DL
ALP BLD-CCNC: 66 U/L
ALT SERPL-CCNC: 18 U/L
ANION GAP SERPL CALC-SCNC: 9 MMOL/L
AST SERPL-CCNC: 15 U/L
B BURGDOR IGG+IGM SER QL IB: NORMAL
BASOPHILS # BLD AUTO: 0.04 K/UL
BASOPHILS NFR BLD AUTO: 0.6 %
BILIRUB SERPL-MCNC: 0.2 MG/DL
BUN SERPL-MCNC: 15 MG/DL
CALCIUM SERPL-MCNC: 10.1 MG/DL
CHLORIDE SERPL-SCNC: 98 MMOL/L
CO2 SERPL-SCNC: 30 MMOL/L
CREAT SERPL-MCNC: 0.54 MG/DL
EOSINOPHIL # BLD AUTO: 0.1 K/UL
EOSINOPHIL NFR BLD AUTO: 1.6 %
GLUCOSE SERPL-MCNC: 240 MG/DL
HCT VFR BLD CALC: 39.7 %
HGB BLD-MCNC: 12.8 G/DL
IMM GRANULOCYTES NFR BLD AUTO: 0.2 %
LYMPHOCYTES # BLD AUTO: 2.48 K/UL
LYMPHOCYTES NFR BLD AUTO: 40.3 %
MAN DIFF?: NORMAL
MCHC RBC-ENTMCNC: 27.2 PG
MCHC RBC-ENTMCNC: 32.2 GM/DL
MCV RBC AUTO: 84.3 FL
MONOCYTES # BLD AUTO: 0.65 K/UL
MONOCYTES NFR BLD AUTO: 10.6 %
NEUTROPHILS # BLD AUTO: 2.88 K/UL
NEUTROPHILS NFR BLD AUTO: 46.7 %
PLATELET # BLD AUTO: 249 K/UL
POTASSIUM SERPL-SCNC: 4.4 MMOL/L
PROT SERPL-MCNC: 7.1 G/DL
RBC # BLD: 4.71 M/UL
RBC # FLD: 13 %
SODIUM SERPL-SCNC: 137 MMOL/L
WBC # FLD AUTO: 6.16 K/UL

## 2021-05-03 ENCOUNTER — APPOINTMENT (OUTPATIENT)
Dept: CARDIOLOGY | Facility: CLINIC | Age: 61
End: 2021-05-03
Payer: COMMERCIAL

## 2021-05-03 VITALS — TEMPERATURE: 97.1 F | HEIGHT: 64 IN | WEIGHT: 240 LBS | BODY MASS INDEX: 40.97 KG/M2

## 2021-05-03 VITALS
TEMPERATURE: 98.6 F | BODY MASS INDEX: 40.97 KG/M2 | SYSTOLIC BLOOD PRESSURE: 125 MMHG | OXYGEN SATURATION: 98 % | WEIGHT: 240 LBS | HEART RATE: 70 BPM | DIASTOLIC BLOOD PRESSURE: 72 MMHG | HEIGHT: 64 IN

## 2021-05-03 DIAGNOSIS — R07.9 CHEST PAIN, UNSPECIFIED: ICD-10-CM

## 2021-05-03 PROCEDURE — 93306 TTE W/DOPPLER COMPLETE: CPT

## 2021-05-03 PROCEDURE — 99072 ADDL SUPL MATRL&STAF TM PHE: CPT

## 2021-05-03 PROCEDURE — 99204 OFFICE O/P NEW MOD 45 MIN: CPT | Mod: 25

## 2021-05-03 NOTE — HISTORY OF PRESENT ILLNESS
[FreeTextEntry1] : Pt presents as new pt to establish care. Pt states she has been experiencing chest pain, shortness of breath and dizziness for the past week. Pt states that the chest pain and shortness of breath are worse on exertion. Pt states that Gas-x sometimes helps the pain. Pain lasts about ten minutes and then goes away. \par Pt w/ hx of OCP which she has been in remission since 2017, s/p treatment w/ Rituxan and Decadron from [id number]. \par Pt w/ hx of type II DM which is uncontrolled, last A1C was 11. \par The patient presents for evaluation of high blood pressure. Patient is currently tolerating the current  antihypertensive regime and they deny headaches, stiff neck, visual changes, pedal Edema or PND. They also are here for follow-up of elevated cholesterol. Patient is currently tolerating medication and and does not complain of new  muscle pain, joint pain, back pain,urinary changes ,nausea, vomiting, abdominal pain or diarrhea. The patient is trying to follow a low cholesterol diet. \par Patient also presents today for follow-up of obesity. The patient states they have not lost significant weight since the last visit. Patient is currently obese and remains sedentary. The patient has not been compliant with medical follow-up instructions for weight-loss and exercise since the last visit.\par

## 2021-05-07 LAB
ALBUMIN SERPL ELPH-MCNC: 4.3 G/DL
ALP BLD-CCNC: 67 U/L
ALT SERPL-CCNC: 17 U/L
ANION GAP SERPL CALC-SCNC: 12 MMOL/L
AST SERPL-CCNC: 19 U/L
BILIRUB DIRECT SERPL-MCNC: 0.1 MG/DL
BILIRUB INDIRECT SERPL-MCNC: 0.2 MG/DL
BILIRUB SERPL-MCNC: 0.3 MG/DL
BUN SERPL-MCNC: 13 MG/DL
CALCIUM SERPL-MCNC: 10.2 MG/DL
CHLORIDE SERPL-SCNC: 98 MMOL/L
CHOLEST SERPL-MCNC: 179 MG/DL
CK SERPL-CCNC: 85 U/L
CO2 SERPL-SCNC: 27 MMOL/L
CREAT SERPL-MCNC: 0.48 MG/DL
CRP SERPL-MCNC: 5 MG/L
ERYTHROCYTE [SEDIMENTATION RATE] IN BLOOD BY WESTERGREN METHOD: 22 MM/HR
ESTIMATED AVERAGE GLUCOSE: 269 MG/DL
GLUCOSE SERPL-MCNC: 223 MG/DL
HBA1C MFR BLD HPLC: 11 %
HDLC SERPL-MCNC: 50 MG/DL
LDLC SERPL CALC-MCNC: 91 MG/DL
LDLC SERPL DIRECT ASSAY-MCNC: 114 MG/DL
NONHDLC SERPL-MCNC: 130 MG/DL
POTASSIUM SERPL-SCNC: 4.4 MMOL/L
PROT SERPL-MCNC: 7.3 G/DL
SODIUM SERPL-SCNC: 137 MMOL/L
TRIGL SERPL-MCNC: 191 MG/DL

## 2021-05-14 ENCOUNTER — OUTPATIENT (OUTPATIENT)
Dept: OUTPATIENT SERVICES | Facility: HOSPITAL | Age: 61
LOS: 1 days | End: 2021-05-14
Payer: SELF-PAY

## 2021-05-14 ENCOUNTER — APPOINTMENT (OUTPATIENT)
Dept: CT IMAGING | Facility: CLINIC | Age: 61
End: 2021-05-14
Payer: SELF-PAY

## 2021-05-14 DIAGNOSIS — R07.9 CHEST PAIN, UNSPECIFIED: ICD-10-CM

## 2021-05-14 DIAGNOSIS — Z98.89 OTHER SPECIFIED POSTPROCEDURAL STATES: Chronic | ICD-10-CM

## 2021-05-14 DIAGNOSIS — Z90.710 ACQUIRED ABSENCE OF BOTH CERVIX AND UTERUS: Chronic | ICD-10-CM

## 2021-05-14 DIAGNOSIS — Z90.89 ACQUIRED ABSENCE OF OTHER ORGANS: Chronic | ICD-10-CM

## 2021-05-14 DIAGNOSIS — Z98.890 OTHER SPECIFIED POSTPROCEDURAL STATES: Chronic | ICD-10-CM

## 2021-05-14 DIAGNOSIS — Z00.8 ENCOUNTER FOR OTHER GENERAL EXAMINATION: ICD-10-CM

## 2021-05-14 PROCEDURE — 75571 CT HRT W/O DYE W/CA TEST: CPT

## 2021-05-14 PROCEDURE — 75571 CT HRT W/O DYE W/CA TEST: CPT | Mod: 26

## 2021-05-19 ENCOUNTER — APPOINTMENT (OUTPATIENT)
Dept: ENDOCRINOLOGY | Facility: CLINIC | Age: 61
End: 2021-05-19
Payer: COMMERCIAL

## 2021-05-19 VITALS
HEART RATE: 76 BPM | WEIGHT: 240 LBS | SYSTOLIC BLOOD PRESSURE: 118 MMHG | OXYGEN SATURATION: 97 % | TEMPERATURE: 98.5 F | HEIGHT: 64 IN | DIASTOLIC BLOOD PRESSURE: 82 MMHG | BODY MASS INDEX: 40.97 KG/M2

## 2021-05-19 PROCEDURE — 99204 OFFICE O/P NEW MOD 45 MIN: CPT

## 2021-05-19 PROCEDURE — 99072 ADDL SUPL MATRL&STAF TM PHE: CPT

## 2021-05-19 PROCEDURE — 82962 GLUCOSE BLOOD TEST: CPT

## 2021-05-20 LAB — GLUCOSE BLDC GLUCOMTR-MCNC: 232

## 2021-05-29 NOTE — HISTORY OF PRESENT ILLNESS
[FreeTextEntry1] : Ms. MUKUND RESENDEZ is a 60 year old female who presents for initial endocrine evaluation with regard to a hx of type 2 dm. I had seen her in the past over 3 years ago. The diabetes has been present since 2014. She denies any history of retinopathy or nephropathy. With regard to neuropathy,she admits to intermittent mild tingling on dorsal aspect of feet. For the diabetes, she is currently taking Trulicity 1.5 mg weekly and Basaglar 60 units at bedtime, Metformin 1,000 mg BID, glimepiride 2 mg. She denies polyuria, polydipsia, or any visual changes. She  too denies any skin lesions, skin breakdown or non-healing areas of skin. She  too denies any podiatric concerns. Ophthalmologic evaluation is up to date, no retinopathy noted. Home glucose monitoring via yvonne device has shown values to be running in the 200s during the day, and up to 400s overnight. She  does deny any hypoglycemia or hypoglycemic signs or symptoms.\par Too, reports "heat" sensation of feet after eating. \par Pt reports headaches on glimepiride \par Recent A1c 11.0 % from 05/03/2021  \par POCT glucose returned today at  232 mg/dL\par \par For breakfast, she usually has coffee. \par Additional medical history includes that of common vaiable  immunodeficiency (was on rituxan),hypertension, fatty liver, hx of uterine cancer [total hysterectomy], osteoporosis. along with recurrent H. Pylori and Ocular Pemphigoid\par Off HTN medications since past 2 years.  \par Wt from 275 lb down to 240 lb\par \par Denies COVID infection. \par Received both doses of Moderna COVID vaccine.

## 2021-06-07 ENCOUNTER — APPOINTMENT (OUTPATIENT)
Dept: CARDIOLOGY | Facility: CLINIC | Age: 61
End: 2021-06-07
Payer: COMMERCIAL

## 2021-06-07 PROCEDURE — 99072 ADDL SUPL MATRL&STAF TM PHE: CPT

## 2021-06-07 PROCEDURE — 93015 CV STRESS TEST SUPVJ I&R: CPT

## 2021-06-07 PROCEDURE — 78452 HT MUSCLE IMAGE SPECT MULT: CPT

## 2021-06-07 PROCEDURE — A9500: CPT

## 2021-06-08 ENCOUNTER — NON-APPOINTMENT (OUTPATIENT)
Age: 61
End: 2021-06-08

## 2021-06-09 ENCOUNTER — APPOINTMENT (OUTPATIENT)
Dept: CARDIOLOGY | Facility: CLINIC | Age: 61
End: 2021-06-09
Payer: COMMERCIAL

## 2021-06-09 VITALS
OXYGEN SATURATION: 96 % | HEIGHT: 64 IN | HEART RATE: 71 BPM | DIASTOLIC BLOOD PRESSURE: 80 MMHG | SYSTOLIC BLOOD PRESSURE: 116 MMHG | BODY MASS INDEX: 42 KG/M2 | WEIGHT: 246 LBS

## 2021-06-09 DIAGNOSIS — D83.9 COMMON VARIABLE IMMUNODEFICIENCY, UNSPECIFIED: ICD-10-CM

## 2021-06-09 DIAGNOSIS — R68.89 OTHER GENERAL SYMPTOMS AND SIGNS: ICD-10-CM

## 2021-06-09 DIAGNOSIS — R93.1 ABNORMAL FINDINGS ON DIAGNOSTIC IMAGING OF HEART AND CORONARY CIRCULATION: ICD-10-CM

## 2021-06-09 PROCEDURE — 99214 OFFICE O/P EST MOD 30 MIN: CPT

## 2021-06-09 PROCEDURE — 99072 ADDL SUPL MATRL&STAF TM PHE: CPT

## 2021-06-10 LAB
ALBUMIN SERPL ELPH-MCNC: 4.3 G/DL
ALP BLD-CCNC: 74 U/L
ALT SERPL-CCNC: 14 U/L
ANION GAP SERPL CALC-SCNC: 11 MMOL/L
AST SERPL-CCNC: 19 U/L
BILIRUB DIRECT SERPL-MCNC: 0.1 MG/DL
BILIRUB INDIRECT SERPL-MCNC: 0.1 MG/DL
BILIRUB SERPL-MCNC: 0.2 MG/DL
BUN SERPL-MCNC: 15 MG/DL
CALCIUM SERPL-MCNC: 9.9 MG/DL
CHLORIDE SERPL-SCNC: 99 MMOL/L
CHOLEST SERPL-MCNC: 134 MG/DL
CK SERPL-CCNC: 76 U/L
CO2 SERPL-SCNC: 26 MMOL/L
COVID-19 SPIKE DOMAIN ANTIBODY INTERPRETATION: POSITIVE
CREAT SERPL-MCNC: 0.45 MG/DL
ESTIMATED AVERAGE GLUCOSE: 275 MG/DL
GLUCOSE SERPL-MCNC: 268 MG/DL
HBA1C MFR BLD HPLC: 11.2 %
HDLC SERPL-MCNC: 47 MG/DL
LDLC SERPL CALC-MCNC: 70 MG/DL
LDLC SERPL DIRECT ASSAY-MCNC: 71 MG/DL
NONHDLC SERPL-MCNC: 87 MG/DL
POTASSIUM SERPL-SCNC: 4.4 MMOL/L
PROT SERPL-MCNC: 7.6 G/DL
SARS-COV-2 AB SERPL IA-ACNC: >250 U/ML
SODIUM SERPL-SCNC: 136 MMOL/L
TRIGL SERPL-MCNC: 86 MG/DL

## 2021-06-11 NOTE — HISTORY OF PRESENT ILLNESS
[FreeTextEntry1] : Pt presents for follow up s/p chest discomfort at last visit. Pt states she occasionally still experiences left sided chest pain right under her left breast. Pt denies any shortness of breath, palpitations, dizziness, nausea/vomiting, abdominal pain, cough, fever. \par Pt following up s/p Calcium score of 184-Zetia added and pt had negative nuclear stress test a few days ago\par The patient presents for evaluation of high blood pressure. Patient is currently tolerating the current  antihypertensive regime and they deny headaches, stiff neck, visual changes, pedal Edema or PND. They also are here for follow-up of elevated cholesterol. Patient is currently tolerating medication and and does not complain of new  muscle pain, joint pain, back pain,urinary changes ,nausea, vomiting, abdominal pain or diarrhea. The patient is trying to follow a low cholesterol diet. \par The patient also presents for continued care of diabetes mellitus. Patient is following the diabetic regimen. Patient is tolerating hypoglycemic agents and following the diet. Patient denies any visual changes, blood in the urine, abdominal pain, nausea, vomiting, myalgias, arthralgias, paresthesia’s and syncope. The patient denies any chest discomfort, shortness of breath or new neurologic signs or symptoms. Patient denies any polyuria, worsening nocturia, or polydipsia. \par Pt states that she has been feeling very "foggy" and forgetful  and has been losing her balance frequently. States she has fallen three times in the past year. Pt denies dizziness, numbness/tingling or blurry vision. \par Pt is interested in weight loss surgery. \par pt states today that they had both covid 19 vaccine . pt had the/ moderna Vaccine without   side effects.  pt denies any recent sore throat, fever, chills  loss of taste or smell.\par \par

## 2021-06-11 NOTE — ED ADULT NURSE NOTE - SEVERITY
How Severe Are Your Spot(S)?: mild What Type Of Note Output Would You Prefer (Optional)?: Standard Output What Is The Reason For Today's Visit?: Full Body Skin Examination What Is The Reason For Today's Visit? (Being Monitored For X): concerning skin lesions on an annual basis Additional History: Patient present for her first TBE. Patient has a history of chronic sun exposure and has a family history of MM (cousin) and NMSC (sisters) PAIN SCALE 9 OF 10.

## 2021-06-15 ENCOUNTER — APPOINTMENT (OUTPATIENT)
Dept: ENDOCRINOLOGY | Facility: CLINIC | Age: 61
End: 2021-06-15
Payer: COMMERCIAL

## 2021-06-15 VITALS
OXYGEN SATURATION: 96 % | RESPIRATION RATE: 16 BRPM | DIASTOLIC BLOOD PRESSURE: 78 MMHG | HEART RATE: 68 BPM | BODY MASS INDEX: 42.23 KG/M2 | SYSTOLIC BLOOD PRESSURE: 118 MMHG | TEMPERATURE: 98.6 F | WEIGHT: 246 LBS

## 2021-06-15 DIAGNOSIS — E78.00 PURE HYPERCHOLESTEROLEMIA, UNSPECIFIED: ICD-10-CM

## 2021-06-15 PROCEDURE — 99214 OFFICE O/P EST MOD 30 MIN: CPT

## 2021-06-15 PROCEDURE — 99072 ADDL SUPL MATRL&STAF TM PHE: CPT

## 2021-06-17 ENCOUNTER — APPOINTMENT (OUTPATIENT)
Dept: BARIATRICS | Facility: CLINIC | Age: 61
End: 2021-06-17
Payer: COMMERCIAL

## 2021-06-17 VITALS
OXYGEN SATURATION: 99 % | HEIGHT: 63 IN | WEIGHT: 245 LBS | DIASTOLIC BLOOD PRESSURE: 80 MMHG | HEART RATE: 84 BPM | SYSTOLIC BLOOD PRESSURE: 130 MMHG | TEMPERATURE: 96.7 F | BODY MASS INDEX: 43.41 KG/M2

## 2021-06-17 DIAGNOSIS — K76.0 FATTY (CHANGE OF) LIVER, NOT ELSEWHERE CLASSIFIED: ICD-10-CM

## 2021-06-17 DIAGNOSIS — Z13.228 ENCOUNTER FOR SCREENING FOR OTHER SUSPECTED ENDOCRINE DISORDER: ICD-10-CM

## 2021-06-17 DIAGNOSIS — R60.9 EDEMA, UNSPECIFIED: ICD-10-CM

## 2021-06-17 DIAGNOSIS — Z13.0 ENCOUNTER FOR SCREENING FOR DISEASES OF THE BLOOD AND BLOOD-FORMING ORGANS AND CERTAIN DISORDERS INVOLVING THE IMMUNE MECHANISM: ICD-10-CM

## 2021-06-17 DIAGNOSIS — I10 ESSENTIAL (PRIMARY) HYPERTENSION: ICD-10-CM

## 2021-06-17 DIAGNOSIS — Z13.0 ENCOUNTER FOR SCREENING FOR OTHER SUSPECTED ENDOCRINE DISORDER: ICD-10-CM

## 2021-06-17 DIAGNOSIS — E11.9 TYPE 2 DIABETES MELLITUS W/OUT COMPLICATIONS: ICD-10-CM

## 2021-06-17 DIAGNOSIS — Z13.29 ENCOUNTER FOR SCREENING FOR OTHER SUSPECTED ENDOCRINE DISORDER: ICD-10-CM

## 2021-06-17 PROCEDURE — 99205 OFFICE O/P NEW HI 60 MIN: CPT

## 2021-06-17 PROCEDURE — 99072 ADDL SUPL MATRL&STAF TM PHE: CPT

## 2021-06-17 RX ORDER — DICLOFENAC SODIUM 100 MG/1
100 TABLET, FILM COATED, EXTENDED RELEASE ORAL
Qty: 7 | Refills: 0 | Status: COMPLETED | COMMUNITY
Start: 2021-05-03 | End: 2021-06-17

## 2021-06-17 RX ORDER — ASCORBIC ACID 500 MG
400 TABLET ORAL
Refills: 0 | Status: COMPLETED | COMMUNITY
Start: 2018-02-07 | End: 2021-06-17

## 2021-06-17 NOTE — REVIEW OF SYSTEMS
[Fatigue] : fatigue [Recent Change In Weight] : ~T recent weight change [Joint Stiffness] : joint stiffness [Skin Rash] : skin rash [Negative] : Allergic/Immunologic [FreeTextEntry4] : thirst [FreeTextEntry9] : joint swelling, loss of range of motion

## 2021-06-17 NOTE — ASSESSMENT
[FreeTextEntry1] : 60 year old woman with long-standing history of morbid obesity presents today to discuss options for weight loss surgery.  I had an extensive discussion with the patient reviewing the Laparoscopic Sleeve Gastrectomy. Diagrams were used. All questions were answered.  \par \par Complications were discussed including but not limited to: vitamin and protein deficiencies, pneumonia, urinary infection, wound infection, leaks/peritonitis possibly requiring intraabdominal drains or reoperation, bleeding, DVT, pulmonary embolus, severe reflux, sleeve obstruction, abdominal wall hernias, revisions, death, inadequate weight loss. The importance of vitamins and protein supplementation was stressed, as was the importance of follow-up and exercise. \par \par Patient encouraged to make dietary and lifestyle changes in preparation for surgery.\par \par Patient with a long history of morbid obesity.She is interested in the Laparoscopic Sleeve Gastrectomy. She was given written material to review.  Pre-operative evaluations were reviewed. She will need to lose weight prior to surgery and will be seen again prior to surgery.She was told to call with any questions.

## 2021-06-17 NOTE — PHYSICAL EXAM
[Obese, well nourished, in no acute distress] : obese, well nourished, in no acute distress [Normal] : affect appropriate [de-identified] : right lower extremity edema (chronic) [de-identified] : obese, soft, nontender, no evidence of hernia

## 2021-06-17 NOTE — HISTORY OF PRESENT ILLNESS
[de-identified] : 60 year old woman with a long-standing history of morbid obesity, who has attempted numerous weight loss treatments without long term success. Patient is familiar with the Laparoscopic Adjustable Gastric Band, the Laparoscopic Sleeve Gastrectomy and the Laparoscopic Gastric Bypass. Patient presents today to discuss options for surgery, specifically the Laparoscopic Sleeve Gastrectomy. \par \par Patient has lost 100 pounds twice with low calorie diet.  Patient's weight was 343 in 2008, she has kept most of weight off but has hit a plateau.  She feels that further weight loss would improve her health and is now pursuing weight loss surgery.

## 2021-06-17 NOTE — CONSULT LETTER
[Dear  ___] : Dear  [unfilled], [Consult Letter:] : I had the pleasure of evaluating your patient, [unfilled]. [Please see my note below.] : Please see my note below. [Consult Closing:] : Thank you very much for allowing me to participate in the care of this patient.  If you have any questions, please do not hesitate to contact me. [Sincerely,] : Sincerely, [FreeTextEntry3] : Sybil Cabral MD, FACS

## 2021-06-18 ENCOUNTER — TRANSCRIPTION ENCOUNTER (OUTPATIENT)
Age: 61
End: 2021-06-18

## 2021-06-27 PROBLEM — E78.00 HYPERCHOLESTEROLEMIA: Status: ACTIVE | Noted: 2018-07-20

## 2021-06-27 NOTE — HISTORY OF PRESENT ILLNESS
[FreeTextEntry1] : Ms. MUKUND RESENDEZ is a 60 year old female who presents with regard to a hx of type 2 dm. The diabetes has been present since 2014. She was dx with pre-DM in 2008. She denies any history of retinopathy or nephropathy. With regard to neuropathy,she admits to intermittent mild tingling on dorsal aspect of feet. For the diabetes, she is currently taking Trulicity 1.5 mg weekly and Basaglar 16 units in the am and  60 units at bedtime, and Humalog SS taking only pre-dinner. Off Metformin and glimepiride.. She denies polyuria, polydipsia, or any visual changes. She too denies any skin lesions, skin breakdown or non-healing areas of skin. She too denies any podiatric concerns. Ophthalmologic evaluation is up to date, no retinopathy noted. Home glucose monitoring has shown values to be running in the 200s at bedtime. She does deny any hypoglycemia or hypoglycemic signs or symptoms.\par A1c 11.2 % from 06/09/2021. \par \par Additional medical history includes that of common variable  immunodeficiency (was on Rituxan),hypertension, fatty liver, hx of uterine cancer [total hysterectomy], osteoporosis. along with recurrent H. Pylori and Ocular Pemphigoid\par Off HTN medications since past 2 years. \par Wt from 275 lb down to 240 lb\par \par

## 2021-06-27 NOTE — PHYSICAL EXAM
[Alert] : alert [Well Nourished] : well nourished [No Acute Distress] : no acute distress [Well Developed] : well developed [Normal Sclera/Conjunctiva] : normal sclera/conjunctiva [No Proptosis] : no proptosis [EOMI] : extra ocular movement intact [Normal Oropharynx] : the oropharynx was normal [Thyroid Not Enlarged] : the thyroid was not enlarged [No Thyroid Nodules] : no palpable thyroid nodules [No Respiratory Distress] : no respiratory distress [No Accessory Muscle Use] : no accessory muscle use [Clear to Auscultation] : lungs were clear to auscultation bilaterally [Normal S1, S2] : normal S1 and S2 [Normal Rate] : heart rate was normal [Regular Rhythm] : with a regular rhythm [No Edema] : no peripheral edema [Normal Bowel Sounds] : normal bowel sounds [Pedal Pulses Normal] : the pedal pulses are present [Not Tender] : non-tender [Not Distended] : not distended [Soft] : abdomen soft [Normal Anterior Cervical Nodes] : no anterior cervical lymphadenopathy [No Spinal Tenderness] : no spinal tenderness [Spine Straight] : spine straight [No Stigmata of Cushings Syndrome] : no stigmata of Cushings Syndrome [Normal Gait] : normal gait [Normal Strength/Tone] : muscle strength and tone were normal [No Rash] : no rash [Normal Reflexes] : deep tendon reflexes were 2+ and symmetric [No Tremors] : no tremors [Oriented x3] : oriented to person, place, and time [Acanthosis Nigricans] : no acanthosis nigricans

## 2021-06-28 ENCOUNTER — NON-APPOINTMENT (OUTPATIENT)
Age: 61
End: 2021-06-28

## 2021-06-28 RX ORDER — FLASH GLUCOSE SENSOR
KIT MISCELLANEOUS
Qty: 6 | Refills: 3 | Status: DISCONTINUED | COMMUNITY
Start: 2020-01-17 | End: 2021-06-28

## 2021-06-28 RX ORDER — FLASH GLUCOSE SENSOR
KIT MISCELLANEOUS
Qty: 6 | Refills: 3 | Status: DISCONTINUED | COMMUNITY
Start: 2021-06-15 | End: 2021-06-28

## 2021-06-28 RX ORDER — INSULIN LISPRO 100 [IU]/ML
100 INJECTION, SOLUTION INTRAVENOUS; SUBCUTANEOUS
Qty: 3 | Refills: 2 | Status: DISCONTINUED | COMMUNITY
Start: 2021-05-19 | End: 2021-06-28

## 2021-06-28 RX ORDER — FLASH GLUCOSE SCANNING READER
EACH MISCELLANEOUS
Qty: 1 | Refills: 0 | Status: DISCONTINUED | COMMUNITY
Start: 2021-06-15 | End: 2021-06-28

## 2021-07-01 ENCOUNTER — OUTPATIENT (OUTPATIENT)
Dept: OUTPATIENT SERVICES | Facility: HOSPITAL | Age: 61
LOS: 1 days | End: 2021-07-01
Payer: COMMERCIAL

## 2021-07-01 ENCOUNTER — APPOINTMENT (OUTPATIENT)
Dept: MRI IMAGING | Facility: CLINIC | Age: 61
End: 2021-07-01
Payer: COMMERCIAL

## 2021-07-01 DIAGNOSIS — Z98.890 OTHER SPECIFIED POSTPROCEDURAL STATES: Chronic | ICD-10-CM

## 2021-07-01 DIAGNOSIS — Z98.89 OTHER SPECIFIED POSTPROCEDURAL STATES: Chronic | ICD-10-CM

## 2021-07-01 DIAGNOSIS — Z00.8 ENCOUNTER FOR OTHER GENERAL EXAMINATION: ICD-10-CM

## 2021-07-01 DIAGNOSIS — Z90.710 ACQUIRED ABSENCE OF BOTH CERVIX AND UTERUS: Chronic | ICD-10-CM

## 2021-07-01 DIAGNOSIS — R42 DIZZINESS AND GIDDINESS: ICD-10-CM

## 2021-07-01 DIAGNOSIS — Z90.89 ACQUIRED ABSENCE OF OTHER ORGANS: Chronic | ICD-10-CM

## 2021-07-01 PROCEDURE — 70551 MRI BRAIN STEM W/O DYE: CPT | Mod: 26

## 2021-07-01 PROCEDURE — 70551 MRI BRAIN STEM W/O DYE: CPT

## 2021-07-09 ENCOUNTER — NON-APPOINTMENT (OUTPATIENT)
Age: 61
End: 2021-07-09

## 2021-07-09 ENCOUNTER — APPOINTMENT (OUTPATIENT)
Dept: ENDOCRINOLOGY | Facility: CLINIC | Age: 61
End: 2021-07-09
Payer: COMMERCIAL

## 2021-07-09 VITALS
SYSTOLIC BLOOD PRESSURE: 122 MMHG | HEART RATE: 68 BPM | BODY MASS INDEX: 42.51 KG/M2 | DIASTOLIC BLOOD PRESSURE: 78 MMHG | WEIGHT: 240 LBS | RESPIRATION RATE: 16 BRPM | TEMPERATURE: 98.6 F | OXYGEN SATURATION: 99 %

## 2021-07-09 PROCEDURE — G0108 DIAB MANAGE TRN  PER INDIV: CPT

## 2021-07-09 PROCEDURE — 99072 ADDL SUPL MATRL&STAF TM PHE: CPT

## 2021-08-02 ENCOUNTER — NON-APPOINTMENT (OUTPATIENT)
Age: 61
End: 2021-08-02

## 2021-08-02 ENCOUNTER — APPOINTMENT (OUTPATIENT)
Dept: CARDIOLOGY | Facility: CLINIC | Age: 61
End: 2021-08-02
Payer: COMMERCIAL

## 2021-08-02 VITALS
WEIGHT: 240 LBS | HEART RATE: 70 BPM | SYSTOLIC BLOOD PRESSURE: 120 MMHG | OXYGEN SATURATION: 98 % | HEIGHT: 63 IN | DIASTOLIC BLOOD PRESSURE: 72 MMHG | BODY MASS INDEX: 42.52 KG/M2

## 2021-08-02 DIAGNOSIS — R00.2 PALPITATIONS: ICD-10-CM

## 2021-08-02 PROCEDURE — 99214 OFFICE O/P EST MOD 30 MIN: CPT

## 2021-08-02 PROCEDURE — 93000 ELECTROCARDIOGRAM COMPLETE: CPT

## 2021-09-03 ENCOUNTER — APPOINTMENT (OUTPATIENT)
Dept: ULTRASOUND IMAGING | Facility: IMAGING CENTER | Age: 61
End: 2021-09-03
Payer: COMMERCIAL

## 2021-09-03 ENCOUNTER — APPOINTMENT (OUTPATIENT)
Dept: MAMMOGRAPHY | Facility: IMAGING CENTER | Age: 61
End: 2021-09-03
Payer: COMMERCIAL

## 2021-09-03 ENCOUNTER — OUTPATIENT (OUTPATIENT)
Dept: OUTPATIENT SERVICES | Facility: HOSPITAL | Age: 61
LOS: 1 days | End: 2021-09-03
Payer: COMMERCIAL

## 2021-09-03 DIAGNOSIS — Z90.710 ACQUIRED ABSENCE OF BOTH CERVIX AND UTERUS: Chronic | ICD-10-CM

## 2021-09-03 DIAGNOSIS — Z00.8 ENCOUNTER FOR OTHER GENERAL EXAMINATION: ICD-10-CM

## 2021-09-03 DIAGNOSIS — Z98.890 OTHER SPECIFIED POSTPROCEDURAL STATES: Chronic | ICD-10-CM

## 2021-09-03 DIAGNOSIS — Z90.89 ACQUIRED ABSENCE OF OTHER ORGANS: Chronic | ICD-10-CM

## 2021-09-03 DIAGNOSIS — Z98.89 OTHER SPECIFIED POSTPROCEDURAL STATES: Chronic | ICD-10-CM

## 2021-09-03 PROCEDURE — 76641 ULTRASOUND BREAST COMPLETE: CPT

## 2021-09-03 PROCEDURE — 77067 SCR MAMMO BI INCL CAD: CPT

## 2021-09-03 PROCEDURE — 77067 SCR MAMMO BI INCL CAD: CPT | Mod: 26

## 2021-09-03 PROCEDURE — 77063 BREAST TOMOSYNTHESIS BI: CPT | Mod: 26

## 2021-09-03 PROCEDURE — 77063 BREAST TOMOSYNTHESIS BI: CPT

## 2021-09-03 PROCEDURE — 76641 ULTRASOUND BREAST COMPLETE: CPT | Mod: 26,50

## 2021-09-08 ENCOUNTER — APPOINTMENT (OUTPATIENT)
Dept: BARIATRICS | Facility: CLINIC | Age: 61
End: 2021-09-08
Payer: COMMERCIAL

## 2021-09-08 VITALS
BODY MASS INDEX: 43.67 KG/M2 | SYSTOLIC BLOOD PRESSURE: 120 MMHG | HEIGHT: 63 IN | HEART RATE: 72 BPM | DIASTOLIC BLOOD PRESSURE: 74 MMHG | OXYGEN SATURATION: 98 % | WEIGHT: 246.47 LBS

## 2021-09-08 DIAGNOSIS — Z92.29 PERSONAL HISTORY OF OTHER DRUG THERAPY: ICD-10-CM

## 2021-09-08 DIAGNOSIS — I25.10 ATHEROSCLEROTIC HEART DISEASE OF NATIVE CORONARY ARTERY W/OUT ANGINA PECTORIS: ICD-10-CM

## 2021-09-08 DIAGNOSIS — F41.8 OTHER SPECIFIED ANXIETY DISORDERS: ICD-10-CM

## 2021-09-08 DIAGNOSIS — L30.9 DERMATITIS, UNSPECIFIED: ICD-10-CM

## 2021-09-08 DIAGNOSIS — K63.5 POLYP OF COLON: ICD-10-CM

## 2021-09-08 DIAGNOSIS — Z87.898 PERSONAL HISTORY OF OTHER SPECIFIED CONDITIONS: ICD-10-CM

## 2021-09-08 PROCEDURE — 99214 OFFICE O/P EST MOD 30 MIN: CPT

## 2021-09-09 NOTE — PHYSICAL EXAM
[Obese] : obese [Normal] : grossly intact [de-identified] : EOMI , Anicteric  [de-identified] : obese soft non-tender no evidence of hernia

## 2021-09-09 NOTE — ASSESSMENT
[FreeTextEntry1] : 60 year old F undergoing workup for laparoscopic sleeve gastrectomy here for a PRE OP  VISIT. Current weight is 246 lbs.Weight stable  since last visit. Hx of IDDM - recent HBA1 C ~ 11 % - plan to recheck. hx of + H PYLORI - December 2020 - pt was treated with antibiotic underwent a stool testing - negative. Plan to forward copy to our office. + Maladaptive Eating Patterns. \par \par Pre op education classes completed. \par Nutrition one on one scheduled on 9/16/21  Psych scheduled 9/1/2021. \par Patient is aware she needs to lose weight prior to surgery. \par GI consult and subsequent Upper EGD- completed .  awaiting results of H pylori stool testing \par Needs letter of support/ diet history / routine labs prior to surgery.\par Appointments  and testing with cardiology completed  /pulmonary scheduled.\par Plan to obtain clearance from Endocrinologist prior to being cleared for surgery. - IDDM                                    \par Plan to have a bilateral Lower Extremity Venous Doppler prior to being cleared for surgery. \par \par Nutritional counseling has been provided. The patient is encouraged to remain calorie conscious and continue a low fat, low carbohydrate, protein focus diet. Pt encouraged to participate in a daily exercise regimen incorporating cardio and strength training. Pt was given some samples of protein shakes - utilize as a meal replacement for breakfast. \par \par Return to office after completion of all requirements needed prior to scheduling surgery . \par \par Additional time spent  reviewing chart before and after visit. \par

## 2021-09-09 NOTE — HISTORY OF PRESENT ILLNESS
[de-identified] : 60 year old F undergoing workup for laparoscopic sleeve gastrectomy here for a PRE OP  VISIT. Current weight is 246 lbs.Weight stable  since last visit.  Patient is currently in the process of completing her workup as well as a medically supervised diet. Patient continues to make efforts to improve food choices and admits to making some poor food choices over the summer and consuming larger portions.Pt is following a protein focused diet - 2  meals a day ( admits to skipping meals often). Pt continues to work on  consuming a sufficient amount of zero calorie liquid.  Patient knows she needs to lose weight prior to surgery. Plan start some exercise including walking. \par

## 2021-09-09 NOTE — REVIEW OF SYSTEMS
[Negative] : Heme/Lymph [Fever] : no fever [Chills] : no chills [Recent Change In Weight] : ~T no recent weight change [Dysphagia] : no dysphagia [Loss of Hearing] : no loss of hearing [Hoarseness] : no hoarseness [Chest Pain] : no chest pain [Palpitations] : no palpitations [Leg Claudication] : no intermittent leg claudication [Lower Ext Edema] : no lower extremity edema [Shortness Of Breath] : no shortness of breath [Wheezing] : no wheezing [Cough] : no cough [SOB on Exertion] : no shortness of breath during exertion [Abdominal Pain] : no abdominal pain [Vomiting] : no vomiting [Constipation] : no constipation [Diarrhea] : no diarrhea [Reflux/Heartburn] : no reflux/ heartburn [FreeTextEntry2] : weight stable

## 2021-09-16 ENCOUNTER — APPOINTMENT (OUTPATIENT)
Dept: BARIATRICS/WEIGHT MGMT | Facility: CLINIC | Age: 61
End: 2021-09-16
Payer: COMMERCIAL

## 2021-09-16 VITALS — BODY MASS INDEX: 43.23 KG/M2 | WEIGHT: 244 LBS | HEIGHT: 63 IN

## 2021-09-16 DIAGNOSIS — Z79.4 TYPE 2 DIABETES MELLITUS W/OUT COMPLICATIONS: ICD-10-CM

## 2021-09-16 DIAGNOSIS — E66.01 MORBID (SEVERE) OBESITY DUE TO EXCESS CALORIES: ICD-10-CM

## 2021-09-16 DIAGNOSIS — R63.5 ABNORMAL WEIGHT GAIN: ICD-10-CM

## 2021-09-16 DIAGNOSIS — E11.9 TYPE 2 DIABETES MELLITUS W/OUT COMPLICATIONS: ICD-10-CM

## 2021-09-16 PROCEDURE — 97802 MEDICAL NUTRITION INDIV IN: CPT | Mod: 95

## 2021-10-05 NOTE — ED ADULT TRIAGE NOTE - WEIGHT METHOD
CAMELIAM with direct call back to discuss scheduling follow up appt with Dr. Ley. Informed her that provider is booked out for quite some time.   
stated

## 2021-10-06 PROBLEM — I10 ESSENTIAL HYPERTENSION: Status: ACTIVE | Noted: 2018-07-20

## 2021-10-08 ENCOUNTER — APPOINTMENT (OUTPATIENT)
Dept: ENDOCRINOLOGY | Facility: CLINIC | Age: 61
End: 2021-10-08

## 2021-10-13 ENCOUNTER — APPOINTMENT (OUTPATIENT)
Dept: BARIATRICS/WEIGHT MGMT | Facility: CLINIC | Age: 61
End: 2021-10-13
Payer: COMMERCIAL

## 2021-10-13 VITALS — HEIGHT: 63 IN | BODY MASS INDEX: 41.82 KG/M2 | WEIGHT: 236 LBS

## 2021-10-13 PROCEDURE — 90791 PSYCH DIAGNOSTIC EVALUATION: CPT | Mod: 95

## 2021-10-22 NOTE — PAST MEDICAL HISTORY
[Postmenopausal] : The patient is postmenopausal [Menarche Age ____] : age at menarche was [unfilled] [Menopause Age____] : age at menopause was [unfilled] [Definite ___ (Date)] : the last menstrual period was [unfilled] [Normal Amount/Duration] : it was of a normal amount and duration [Regular Cycle Intervals] : have been regular [Total Preg ___] : G[unfilled] [Full Term ___] : Full Term: [unfilled] [Living ___] : Living: [unfilled]

## 2021-10-25 ENCOUNTER — APPOINTMENT (OUTPATIENT)
Dept: GYNECOLOGIC ONCOLOGY | Facility: CLINIC | Age: 61
End: 2021-10-25
Payer: COMMERCIAL

## 2021-10-25 VITALS — TEMPERATURE: 97.9 F

## 2021-10-25 VITALS
HEIGHT: 63 IN | BODY MASS INDEX: 41.11 KG/M2 | SYSTOLIC BLOOD PRESSURE: 131 MMHG | HEART RATE: 85 BPM | DIASTOLIC BLOOD PRESSURE: 84 MMHG | WEIGHT: 232 LBS

## 2021-10-25 PROCEDURE — 99212 OFFICE O/P EST SF 10 MIN: CPT

## 2021-10-25 NOTE — HISTORY OF PRESENT ILLNESS
[FreeTextEntry1] : GYN- Dr. Jones (recently retired)\par Former GYN Onc- Dr. Morin & Dr. Beltre\par PCP/Referring- Dr. PRISCILA Hernandez\par GI- Dr PHILIP Barlow (was Dr. Jere Cantu)\par Hepatologist- Dr. Neville\par Allergist- Dr. Liana Johns\par Immunologist- Dr. Chappell\par Endo- Dr. Shirley Huber\par Pulm- Dr. Cormier\par \par Ms. Guzman presents for evaluation of a painful lump on her mons since 10/20/21. Denies fever/chills at home. She reports shaving the mons twice a year prior to GYN appointments. Temp 97.9 in office today. \par \par She is a 58 year old  LMP  who was last seen 2021 to establish care.  \par \par The patient has a past GYN history significant for RSO in  for right dermoid cyst and  stage 1 endometrial cancer s/p CHELSEA LSO, bilateral SNL dissection in 2016 with Dr. Morin.  Pathology consistent with FIGO grade 1 endometrioid adenocarcinoma, 1/15 mm myoinvasion, no LVSI, negative staging.  MMR IHC intact x 4.  No further treatment indicated.  Care transferred to Dr. Gloria Beltre in 2018.  The patient's general GYN, Dr. Jones, recently retired.  Last saw a GYN Oncologist in 2018.  \par \par Ms. Guzman has a pmhx significant for HTN, HLD, type 2 DM, obesity, tubular adenoma resection , ocular pemphegoid s/p Rituxan and Decadron, CVID on monthly IVIG, NAFLD. \par \par Today, the patient is without acute complaints.  She mentions recently seeing Dr. Cantu for postprandial pain/cramping and constipation.  The constipation has now resolved.  CCS scheduled for next month.  No vaginal bleeding or discharge, pelvic discomfort, changes in normal urinary habits (though she describes UI for which she's seen SG; she didn't katty her meds and has not had f/u), nausea/vomiting, unintentional weight gain/loss, and all other associated signs and symptoms.\par \par Outside records reviewed. \par \par Health Maintenance:\par Pap- 2015\par Mammo- 2019\par Colonoscopy- ; next scheduled for 2019\par SBE- performs monthly exams\par CBE- 2018 (by Dr. Hernandez)\par \par Int Hx: No VB or VD. No pain. ROS: noncontributory, stable  sx. Saw Endo and GI. \par \par Int Hx (20): No VB or VD. No pain. ROS: noncontributory, stable  sx. Saw Endo and GI.

## 2021-10-25 NOTE — DISCUSSION/SUMMARY
[FreeTextEntry1] : - Discussed the findings of folliculitis with patient (causes and treatment)\par - Rx Keflex 500 mg PO q6hrs x 7 days e-prescribed\par - CBC with diff drawn\par - She will touch base with her Dermatologist office to be seen ASAP\par - Advised to go to ED if she develops fever\par - RTO 10/29/21 for evaluation\par - Dr. Feliz was present for the exam

## 2021-10-25 NOTE — REVIEW OF SYSTEMS
[Negative] : Musculoskeletal [Diabetes] : diabetes mellitus [Incontinence] : incontinence [FreeTextEntry4] : stress/urgency incontinence - stable. Painful lump on the right mons [de-identified] : See HPI

## 2021-10-25 NOTE — PHYSICAL EXAM
[Absent] : Adnexa(ae): Absent [Normal] : Recto-Vaginal Exam: Normal [Abnormal] : External genitalia: Abnormal [Fully active, able to carry on all pre-disease performance without restriction] : Status 0 - Fully active, able to carry on all pre-disease performance without restriction [de-identified] : 2+ edema [de-identified] : Jatinder rodriguez [de-identified] : 2cm area of induration with surrounding erythema noted in the right mons - tender to touch [de-identified] : deferred [de-identified] : deferred [de-identified] : deferred

## 2021-10-26 LAB
BASOPHILS # BLD AUTO: 0.06 K/UL
BASOPHILS NFR BLD AUTO: 0.9 %
EOSINOPHIL # BLD AUTO: 0.09 K/UL
EOSINOPHIL NFR BLD AUTO: 1.4 %
HCT VFR BLD CALC: 40.7 %
HGB BLD-MCNC: 13.2 G/DL
IMM GRANULOCYTES NFR BLD AUTO: 0.3 %
LYMPHOCYTES # BLD AUTO: 2.41 K/UL
LYMPHOCYTES NFR BLD AUTO: 37.2 %
MAN DIFF?: NORMAL
MCHC RBC-ENTMCNC: 26 PG
MCHC RBC-ENTMCNC: 32.4 GM/DL
MCV RBC AUTO: 80.1 FL
MONOCYTES # BLD AUTO: 0.64 K/UL
MONOCYTES NFR BLD AUTO: 9.9 %
NEUTROPHILS # BLD AUTO: 3.26 K/UL
NEUTROPHILS NFR BLD AUTO: 50.3 %
PLATELET # BLD AUTO: 272 K/UL
RBC # BLD: 5.08 M/UL
RBC # FLD: 14.3 %
WBC # FLD AUTO: 6.48 K/UL

## 2021-10-29 ENCOUNTER — APPOINTMENT (OUTPATIENT)
Dept: GYNECOLOGIC ONCOLOGY | Facility: CLINIC | Age: 61
End: 2021-10-29
Payer: COMMERCIAL

## 2021-10-29 DIAGNOSIS — L73.9 FOLLICULAR DISORDER, UNSPECIFIED: ICD-10-CM

## 2021-10-29 PROCEDURE — 99212 OFFICE O/P EST SF 10 MIN: CPT

## 2022-02-02 ENCOUNTER — APPOINTMENT (OUTPATIENT)
Dept: BARIATRICS/WEIGHT MGMT | Facility: CLINIC | Age: 62
End: 2022-02-02
Payer: COMMERCIAL

## 2022-02-02 PROCEDURE — 97803 MED NUTRITION INDIV SUBSEQ: CPT | Mod: 95

## 2022-03-04 ENCOUNTER — NON-APPOINTMENT (OUTPATIENT)
Age: 62
End: 2022-03-04

## 2022-03-26 ENCOUNTER — APPOINTMENT (OUTPATIENT)
Dept: ENDOCRINOLOGY | Facility: CLINIC | Age: 62
End: 2022-03-26
Payer: COMMERCIAL

## 2022-03-26 VITALS
BODY MASS INDEX: 44.3 KG/M2 | SYSTOLIC BLOOD PRESSURE: 138 MMHG | OXYGEN SATURATION: 98 % | TEMPERATURE: 98.4 F | DIASTOLIC BLOOD PRESSURE: 83 MMHG | HEART RATE: 98 BPM | WEIGHT: 250 LBS | HEIGHT: 63 IN

## 2022-03-26 DIAGNOSIS — E11.9 TYPE 2 DIABETES MELLITUS W/OUT COMPLICATIONS: ICD-10-CM

## 2022-03-26 DIAGNOSIS — Z86.39 PERSONAL HISTORY OF OTHER ENDOCRINE, NUTRITIONAL AND METABOLIC DISEASE: ICD-10-CM

## 2022-03-26 LAB
GLUCOSE BLDC GLUCOMTR-MCNC: 257
HBA1C MFR BLD HPLC: 10

## 2022-03-26 PROCEDURE — 99214 OFFICE O/P EST MOD 30 MIN: CPT | Mod: 25

## 2022-03-26 PROCEDURE — 83036 HEMOGLOBIN GLYCOSYLATED A1C: CPT | Mod: QW

## 2022-03-26 PROCEDURE — 95251 CONT GLUC MNTR ANALYSIS I&R: CPT

## 2022-03-26 RX ORDER — BLOOD-GLUCOSE METER
W/DEVICE EACH MISCELLANEOUS
Qty: 1 | Refills: 0 | Status: DISCONTINUED | COMMUNITY
Start: 2020-07-16 | End: 2022-03-26

## 2022-03-26 RX ORDER — GLIMEPIRIDE 2 MG/1
2 TABLET ORAL
Qty: 60 | Refills: 3 | Status: DISCONTINUED | COMMUNITY
Start: 2020-07-16 | End: 2022-03-26

## 2022-03-26 RX ORDER — BLOOD SUGAR DIAGNOSTIC
STRIP MISCELLANEOUS 4 TIMES DAILY
Qty: 2 | Refills: 3 | Status: DISCONTINUED | COMMUNITY
Start: 2017-05-24 | End: 2022-03-26

## 2022-03-26 RX ORDER — INSULIN ASPART 100 [IU]/ML
100 INJECTION, SOLUTION INTRAVENOUS; SUBCUTANEOUS
Qty: 3 | Refills: 0 | Status: DISCONTINUED | COMMUNITY
Start: 2021-06-28 | End: 2022-03-26

## 2022-03-26 RX ORDER — LANCETS
EACH MISCELLANEOUS
Qty: 3 | Refills: 3 | Status: DISCONTINUED | COMMUNITY
Start: 2020-07-16 | End: 2022-03-26

## 2022-03-26 RX ORDER — METFORMIN HYDROCHLORIDE 1000 MG/1
1000 TABLET, COATED ORAL
Qty: 180 | Refills: 1 | Status: ACTIVE | COMMUNITY
Start: 2022-03-26 | End: 1900-01-01

## 2022-03-26 RX ORDER — BLOOD SUGAR DIAGNOSTIC
STRIP MISCELLANEOUS
Qty: 2 | Refills: 3 | Status: DISCONTINUED | COMMUNITY
Start: 2020-07-16 | End: 2022-03-26

## 2022-03-26 NOTE — HISTORY OF PRESENT ILLNESS
[FreeTextEntry1] : Ms. MUKUND RESENDEZ is a 89 tear old female who presents with regard to a hx of type 2 dm. The diabetes has been present since 2014. She was dx with pre-DM in 2008. She denies any history of retinopathy or nephropathy. With regard to neuropathy,she admits to intermittent mild tingling on dorsal aspect of feet. \par \par For the diabetes, she is currently taking Trulicity 1.5 mg weekly and Basaglar 15 units in the am and  60 units at bedtime,  (not taking any in am and taking the 60 units mid day)  and Humalog SS taking only pre-dinner but only if eating high carbs.-takes from Ss about 12-14 units.-but at times does drop to 50's to 60's post meals.                                                                                                      Is taking Metformin 1000 mg bid -fortgets night one. and glimepiride.. She denies polyuria, polydipsia, or any visual changes. She too denies any skin lesions, skin breakdown or non-healing areas of skin. She too denies any podiatric concerns. Ophthalmologic evaluation is up to date, no retinopathy noted.\par \par  Home glucose monitoring has shown values to be running in the  am around 160  today 50 in am but if eats no carbs hs -am value to 120-130.  Intermittetn fasting  -2 measl s1-2 pm and 7 pm.                  She does deny any hypoglycemia or hypoglycemic signs or symptoms.\par A1c 11.2 % from 06/09/2021. \par \par Additional medical history includes that of common variable  immunodeficiency (was on Rituxan),hypertension, fatty liver, hx of uterine cancer [total hysterectomy], osteoporosis. along with recurrent H. Pylori and Ocular Pemphigoid\par \par Off all anti hypertensive  medications since past 2 years. \par Wt from 275 lb down to the 230's\par \par

## 2022-03-30 ENCOUNTER — OUTPATIENT (OUTPATIENT)
Dept: OUTPATIENT SERVICES | Facility: HOSPITAL | Age: 62
LOS: 1 days | End: 2022-03-30
Payer: COMMERCIAL

## 2022-03-30 ENCOUNTER — APPOINTMENT (OUTPATIENT)
Dept: RADIOLOGY | Facility: IMAGING CENTER | Age: 62
End: 2022-03-30
Payer: COMMERCIAL

## 2022-03-30 DIAGNOSIS — Z98.890 OTHER SPECIFIED POSTPROCEDURAL STATES: Chronic | ICD-10-CM

## 2022-03-30 DIAGNOSIS — Z90.710 ACQUIRED ABSENCE OF BOTH CERVIX AND UTERUS: Chronic | ICD-10-CM

## 2022-03-30 DIAGNOSIS — Z00.8 ENCOUNTER FOR OTHER GENERAL EXAMINATION: ICD-10-CM

## 2022-03-30 DIAGNOSIS — M81.0 AGE-RELATED OSTEOPOROSIS WITHOUT CURRENT PATHOLOGICAL FRACTURE: ICD-10-CM

## 2022-03-30 DIAGNOSIS — Z98.89 OTHER SPECIFIED POSTPROCEDURAL STATES: Chronic | ICD-10-CM

## 2022-03-30 DIAGNOSIS — Z90.89 ACQUIRED ABSENCE OF OTHER ORGANS: Chronic | ICD-10-CM

## 2022-03-30 PROCEDURE — 77080 DXA BONE DENSITY AXIAL: CPT

## 2022-03-30 PROCEDURE — 77080 DXA BONE DENSITY AXIAL: CPT | Mod: 26

## 2022-06-25 ENCOUNTER — NON-APPOINTMENT (OUTPATIENT)
Age: 62
End: 2022-06-25

## 2022-07-19 ENCOUNTER — NON-APPOINTMENT (OUTPATIENT)
Age: 62
End: 2022-07-19

## 2022-07-20 NOTE — ASU DISCHARGE PLAN (ADULT/PEDIATRIC) - DISCHARGE TO
Home Elidel Counseling: Patient may experience a mild burning sensation during topical application. Elidel is not approved in children less than 2 years of age. There have been case reports of hematologic and skin malignancies in patients using topical calcineurin inhibitors although causality is questionable.

## 2022-09-02 ENCOUNTER — APPOINTMENT (OUTPATIENT)
Dept: GYNECOLOGIC ONCOLOGY | Facility: CLINIC | Age: 62
End: 2022-09-02

## 2022-09-02 VITALS
BODY MASS INDEX: 42.7 KG/M2 | WEIGHT: 241 LBS | DIASTOLIC BLOOD PRESSURE: 70 MMHG | HEART RATE: 80 BPM | SYSTOLIC BLOOD PRESSURE: 125 MMHG | HEIGHT: 63 IN

## 2022-09-02 PROCEDURE — 99213 OFFICE O/P EST LOW 20 MIN: CPT

## 2022-09-06 ENCOUNTER — APPOINTMENT (OUTPATIENT)
Dept: ENDOCRINOLOGY | Facility: CLINIC | Age: 62
End: 2022-09-06

## 2022-09-08 ENCOUNTER — APPOINTMENT (OUTPATIENT)
Dept: MAMMOGRAPHY | Facility: IMAGING CENTER | Age: 62
End: 2022-09-08

## 2022-09-08 ENCOUNTER — APPOINTMENT (OUTPATIENT)
Dept: ULTRASOUND IMAGING | Facility: IMAGING CENTER | Age: 62
End: 2022-09-08

## 2022-09-08 ENCOUNTER — OUTPATIENT (OUTPATIENT)
Dept: OUTPATIENT SERVICES | Facility: HOSPITAL | Age: 62
LOS: 1 days | End: 2022-09-08
Payer: COMMERCIAL

## 2022-09-08 DIAGNOSIS — Z90.710 ACQUIRED ABSENCE OF BOTH CERVIX AND UTERUS: Chronic | ICD-10-CM

## 2022-09-08 DIAGNOSIS — Z00.8 ENCOUNTER FOR OTHER GENERAL EXAMINATION: ICD-10-CM

## 2022-09-08 DIAGNOSIS — Z98.89 OTHER SPECIFIED POSTPROCEDURAL STATES: Chronic | ICD-10-CM

## 2022-09-08 DIAGNOSIS — Z98.890 OTHER SPECIFIED POSTPROCEDURAL STATES: Chronic | ICD-10-CM

## 2022-09-08 DIAGNOSIS — Z90.89 ACQUIRED ABSENCE OF OTHER ORGANS: Chronic | ICD-10-CM

## 2022-09-08 PROCEDURE — 76641 ULTRASOUND BREAST COMPLETE: CPT | Mod: 26,50

## 2022-09-08 PROCEDURE — 76641 ULTRASOUND BREAST COMPLETE: CPT

## 2022-09-08 PROCEDURE — 77067 SCR MAMMO BI INCL CAD: CPT | Mod: 26

## 2022-09-08 PROCEDURE — 77063 BREAST TOMOSYNTHESIS BI: CPT | Mod: 26

## 2022-09-08 PROCEDURE — 77063 BREAST TOMOSYNTHESIS BI: CPT

## 2022-09-08 PROCEDURE — 77067 SCR MAMMO BI INCL CAD: CPT

## 2022-12-30 ENCOUNTER — NON-APPOINTMENT (OUTPATIENT)
Age: 62
End: 2022-12-30

## 2023-01-01 NOTE — ED PROVIDER NOTE - MEDICAL DECISION MAKING DETAILS
The following labs were labeled with appropriate pt sticker and tubed to lab:     [] Blue     [] Lavender   [] on ice  [x] Green/yellow  [x] Green/black [x] on ice  [] Ela Pester  [] on ice  [] Yellow  [] Red  [] Type/ Screen  [] ABG  [] VBG    [] COVID-19 swab    [] Rapid  [] PCR  [] Flu swab  [] Peds Viral Panel     [] Urine Sample  [] Fecal Sample  [] Pelvic Cultures  [] Blood Cultures  [] X 2  [] STREP Cultures         Christine Will RN  01/01/23 7303 Dr. Morales:  Female patient with past of DM, brought due to distal right leg erythema and swelling. Physical exam was remarkable for mild right medial distal leg erythema consistent with cellulitis. Patient with stable vital signs and no fever. Doppler study ordered to assess for DVT. No crepitance appreciated on physical exam. Will discharge home with oral abx if ultrasound is normal.

## 2023-05-02 NOTE — ASU PREOP CHECKLIST - WEIGHT IN KG
108.9 Drysol Counseling:  I discussed with the patient the risks of drysol/aluminum chloride including but not limited to skin rash, itching, irritation, burning.

## 2023-06-06 ENCOUNTER — RX RENEWAL (OUTPATIENT)
Age: 63
End: 2023-06-06

## 2023-06-12 PROBLEM — R32 URINARY INCONTINENCE: Status: ACTIVE | Noted: 2021-10-29

## 2023-06-19 ENCOUNTER — APPOINTMENT (OUTPATIENT)
Dept: GYNECOLOGIC ONCOLOGY | Facility: CLINIC | Age: 63
End: 2023-06-19
Payer: COMMERCIAL

## 2023-06-19 VITALS
BODY MASS INDEX: 51.91 KG/M2 | DIASTOLIC BLOOD PRESSURE: 85 MMHG | SYSTOLIC BLOOD PRESSURE: 127 MMHG | HEIGHT: 63 IN | WEIGHT: 293 LBS | HEART RATE: 82 BPM

## 2023-06-19 DIAGNOSIS — R32 UNSPECIFIED URINARY INCONTINENCE: ICD-10-CM

## 2023-06-19 DIAGNOSIS — R19.5 OTHER FECAL ABNORMALITIES: ICD-10-CM

## 2023-06-19 PROCEDURE — 99214 OFFICE O/P EST MOD 30 MIN: CPT

## 2023-07-07 ENCOUNTER — APPOINTMENT (OUTPATIENT)
Dept: GYNECOLOGIC ONCOLOGY | Facility: CLINIC | Age: 63
End: 2023-07-07
Payer: COMMERCIAL

## 2023-09-20 ENCOUNTER — OUTPATIENT (OUTPATIENT)
Dept: OUTPATIENT SERVICES | Facility: HOSPITAL | Age: 63
LOS: 1 days | End: 2023-09-20
Payer: COMMERCIAL

## 2023-09-20 ENCOUNTER — APPOINTMENT (OUTPATIENT)
Dept: ULTRASOUND IMAGING | Facility: IMAGING CENTER | Age: 63
End: 2023-09-20
Payer: COMMERCIAL

## 2023-09-20 ENCOUNTER — APPOINTMENT (OUTPATIENT)
Dept: MAMMOGRAPHY | Facility: IMAGING CENTER | Age: 63
End: 2023-09-20
Payer: COMMERCIAL

## 2023-09-20 DIAGNOSIS — Z90.89 ACQUIRED ABSENCE OF OTHER ORGANS: Chronic | ICD-10-CM

## 2023-09-20 DIAGNOSIS — Z90.710 ACQUIRED ABSENCE OF BOTH CERVIX AND UTERUS: Chronic | ICD-10-CM

## 2023-09-20 DIAGNOSIS — Z98.890 OTHER SPECIFIED POSTPROCEDURAL STATES: Chronic | ICD-10-CM

## 2023-09-20 DIAGNOSIS — Z98.89 OTHER SPECIFIED POSTPROCEDURAL STATES: Chronic | ICD-10-CM

## 2023-09-20 DIAGNOSIS — Z00.8 ENCOUNTER FOR OTHER GENERAL EXAMINATION: ICD-10-CM

## 2023-09-20 PROCEDURE — 76641 ULTRASOUND BREAST COMPLETE: CPT

## 2023-09-20 PROCEDURE — 77067 SCR MAMMO BI INCL CAD: CPT

## 2023-09-20 PROCEDURE — 77063 BREAST TOMOSYNTHESIS BI: CPT

## 2023-09-20 PROCEDURE — 77063 BREAST TOMOSYNTHESIS BI: CPT | Mod: 26

## 2023-09-20 PROCEDURE — 76641 ULTRASOUND BREAST COMPLETE: CPT | Mod: 26,50

## 2023-09-20 PROCEDURE — 77067 SCR MAMMO BI INCL CAD: CPT | Mod: 26

## 2023-12-04 NOTE — ED PROVIDER NOTE - OBJECTIVE STATEMENT
"    Subjective   Patient ID: Liv Saucedo is a 81 y.o. female who presents for Sleep Apnea and Pap Adherence Followup.  HPI    Prior Sleep History:  9/18/2023: Office Visit: Dr. Humberto Lucero: Presents with her spouse to establish with new DME supplier.  Also had concerns of CPAP mask.  Fitted with a ResMed P10 mask.  Nocturnal wandering has been a concern and dementia    Current Sleep History:  Here to follow-up on response to the ResMed P10 nasal pillows mask.  Liv reports that the mask fits much better than her prior 1.  She is also using a chinstrap which she finds helpful and is sleeping better.  Her spouse reports that she is having less nocturnal wandering.  Does still wake up with nocturia but able to get back to sleep and her spouse reports reducing liquids in the evening has helped decrease the number of nocturia episodes    A downloaded compliance report was reviewed and was interpreted by myself as follows:  > 4 hour compliance was 93%, with an average use of 9 hours and 32 minutes, with a residual AHI 1.4.     Liv Saucedo reports good benefit from her device.    ESS: 10     Review of Systems  Review of systems negative except as per HPI  Objective   /68   Pulse 68   Ht 1.6 m (5' 3\")   Wt 61.7 kg (136 lb)   SpO2 95%   BMI 24.09 kg/m²    Physical Exam  PHYSICAL EXAM: GENERAL: alert pleasant and cooperative no acute distress  PSYCH EXAM: alert,oriented, in NAD with a full range of affect, normal behavior and no psychotic features    No results found for: \"IRON\", \"TIBC\", \"FERRITIN\"     Assessment/Plan   Problem List Items Addressed This Visit             ICD-10-CM    Obstructive sleep apnea (adult) (pediatric) - Primary G47.33     - Liv Saucedo  has sleep apnea and requires treatment.  - Liv Saucedo demonstrates good compliance and benefit from PAP therapy  - Continue CPAP 8 cmH20 through [facility].  - Order for renewal of PAP supplies placed   -Order to " 58F, extensive pmhx including DM, HTN, HLD, kidney stones (s/p lithotripsy in past), endometrial ca (s/p CHELSEA) presents w chief complaint of 2 days urinary frequency as well as 1 day of dysuria, hematuria, suprapubic pain. patient states she had onset of urinary frequency 2 days ago, and then last night had onset of dysuria, hematuria w/ small clots, and suprapubic pain. Had chills last night which resolved. No associated fevers, chest pain, shortness of breath, other abdominal pain, diarrhea or constipation, weakness, flank pain, nausea or vomiting. Meds include lantus, metformin, lipitor, lexapro, trulicity. Allergy to PCN (hives). quinnavadim. ResMed P10 small mask and chinstrap  -Less nocturia and nocturnal wandering has reduced significantly         Relevant Orders    Positive Airway Pressure (PAP) Therapy

## 2024-01-08 ENCOUNTER — APPOINTMENT (OUTPATIENT)
Dept: PULMONOLOGY | Facility: CLINIC | Age: 64
End: 2024-01-08
Payer: COMMERCIAL

## 2024-01-08 VITALS
DIASTOLIC BLOOD PRESSURE: 83 MMHG | HEART RATE: 85 BPM | OXYGEN SATURATION: 98 % | RESPIRATION RATE: 16 BRPM | SYSTOLIC BLOOD PRESSURE: 131 MMHG

## 2024-01-08 DIAGNOSIS — J40 BRONCHITIS, NOT SPECIFIED AS ACUTE OR CHRONIC: ICD-10-CM

## 2024-01-08 LAB — POCT - HEMOGLOBIN (HGB), QUANTITATIVE, TRANSCUTANEOUS: 13.4

## 2024-01-08 PROCEDURE — ZZZZZ: CPT

## 2024-01-08 PROCEDURE — 88738 HGB QUANT TRANSCUTANEOUS: CPT

## 2024-01-08 PROCEDURE — 94010 BREATHING CAPACITY TEST: CPT

## 2024-01-08 PROCEDURE — 99213 OFFICE O/P EST LOW 20 MIN: CPT | Mod: 25

## 2024-01-08 PROCEDURE — 94729 DIFFUSING CAPACITY: CPT

## 2024-01-08 PROCEDURE — 94727 GAS DIL/WSHOT DETER LNG VOL: CPT

## 2024-01-08 PROCEDURE — 95012 NITRIC OXIDE EXP GAS DETER: CPT

## 2024-01-08 PROCEDURE — 71046 X-RAY EXAM CHEST 2 VIEWS: CPT

## 2024-01-08 NOTE — PHYSICAL EXAM
[General Appearance - Well Developed] : well developed [Normal Appearance] : normal appearance [Well Groomed] : well groomed [General Appearance - Well Nourished] : well nourished [No Deformities] : no deformities [General Appearance - In No Acute Distress] : no acute distress [Normal Conjunctiva] : the conjunctiva exhibited no abnormalities [Eyelids - No Xanthelasma] : the eyelids demonstrated no xanthelasmas [Normal Oropharynx] : normal oropharynx [Neck Appearance] : the appearance of the neck was normal [Neck Cervical Mass (___cm)] : no neck mass was observed [Jugular Venous Distention Increased] : there was no jugular-venous distention [Thyroid Diffuse Enlargement] : the thyroid was not enlarged [Thyroid Nodule] : there were no palpable thyroid nodules [Heart Rate And Rhythm] : heart rate and rhythm were normal [Heart Sounds] : normal S1 and S2 [Murmurs] : no murmurs present [Respiration, Rhythm And Depth] : normal respiratory rhythm and effort [Exaggerated Use Of Accessory Muscles For Inspiration] : no accessory muscle use [Auscultation Breath Sounds / Voice Sounds] : lungs were clear to auscultation bilaterally [Chest Palpation] : palpation of the chest revealed no abnormalities [Lungs Percussion] : the lungs were normal to percussion [Abdomen Soft] : soft [Abdomen Tenderness] : non-tender [Abdomen Mass (___ Cm)] : no abdominal mass palpated [Nail Clubbing] : no clubbing of the fingernails [Cyanosis, Localized] : no localized cyanosis [Petechial Hemorrhages (___cm)] : no petechial hemorrhages [Skin Color & Pigmentation] : normal skin color and pigmentation [] : no rash [No Venous Stasis] : no venous stasis [Skin Lesions] : no skin lesions [No Skin Ulcers] : no skin ulcer [No Xanthoma] : no  xanthoma was observed [Deep Tendon Reflexes (DTR)] : deep tendon reflexes were 2+ and symmetric [Sensation] : the sensory exam was normal to light touch and pinprick [No Focal Deficits] : no focal deficits

## 2024-01-08 NOTE — PROCEDURE
[FreeTextEntry1] : Chest x-ray PA lateral January 8, 2024 indication bronchitis Cardiac size normal Lung fields are grossly clear No appreciable parenchymal infiltrates pleural effusions dominant pulmonary nodules Soft tissue bony structures unremarkable NIOX 17 ppb WNL  1/8/24  PFT 1/8/24 flow  rates nl  lung volumes nl  DLCO 88 %  HGB 13.4 Normal study    Chest x-ray PA and lateral projection 2018 Indication common variable immunodeficiency syndrome newly diagnosed cystoscopy with history non-small cell lung cancer Chest x-ray demonstrates normal cardiac size no evidence of pleural effusions hilum is grossly normal. Rule out subtle right upper lobe faint opacity Impression rule out right upper lobe density  July 2018 Chest CT completed [facility]  2 mm right lower lobe nodule otherwise chest x-ray demonstrates clear lung  PFT demonstrates a very minimal except ventilatory impairment with no response to inhaled bronchodilator at the FEV1 Lung finds are normal Diffusion normal at 92% predicted

## 2024-01-08 NOTE — REVIEW OF SYSTEMS
[Cough] : cough [Chest Tightness] : chest tightness [Wheezing] : wheezing [Diabetes] : diabetes mellitus [Negative] : Psychiatric

## 2024-01-08 NOTE — HISTORY OF PRESENT ILLNESS
[FreeTextEntry1] : Acute visit   2 weeks  sxs cough chest congestion pos sputum yellow wheeze lying on side no fever  Rapid COVID Ag neg x 1 past Friday  Ocular pemphoid (OCP) remission txed 40 infusions retuxin opth Imungloblins  IgG low 499 subtypes IgG 2 is qbx292 IGG4 low at 1.8 IgM 18 low IgA 51 low pending IGG infusion low strep

## 2024-01-29 ENCOUNTER — APPOINTMENT (OUTPATIENT)
Dept: PULMONOLOGY | Facility: CLINIC | Age: 64
End: 2024-01-29
Payer: COMMERCIAL

## 2024-01-29 VITALS — SYSTOLIC BLOOD PRESSURE: 119 MMHG | HEART RATE: 76 BPM | OXYGEN SATURATION: 97 % | DIASTOLIC BLOOD PRESSURE: 74 MMHG

## 2024-01-29 DIAGNOSIS — J45.909 UNSPECIFIED ASTHMA, UNCOMPLICATED: ICD-10-CM

## 2024-01-29 DIAGNOSIS — R05.3 CHRONIC COUGH: ICD-10-CM

## 2024-01-29 DIAGNOSIS — J31.0 CHRONIC RHINITIS: ICD-10-CM

## 2024-01-29 PROBLEM — C55 UTERINE CANCER: Status: ACTIVE | Noted: 2019-08-05

## 2024-01-29 LAB
FLUAV SPEC QL CULT: NEGATIVE
FLUBV AG SPEC QL IA: NEGATIVE
S PYO AG SPEC QL IA: NEGATIVE
SARS-COV-2 AG RESP QL IA.RAPID: NEGATIVE

## 2024-01-29 PROCEDURE — 87804 INFLUENZA ASSAY W/OPTIC: CPT | Mod: QW

## 2024-01-29 PROCEDURE — 99214 OFFICE O/P EST MOD 30 MIN: CPT

## 2024-01-29 PROCEDURE — 87880 STREP A ASSAY W/OPTIC: CPT | Mod: QW

## 2024-01-29 PROCEDURE — 87811 SARS-COV-2 COVID19 W/OPTIC: CPT | Mod: QW

## 2024-01-29 NOTE — DISCUSSION/SUMMARY
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

## 2024-01-29 NOTE — PROCEDURE
Assessment/Plan:       Diagnoses and all orders for this visit:    Type 2 diabetes mellitus with diabetic polyneuropathy, with long-term current use of insulin (HCC)  -     insulin glargine (LANTUS SOLOSTAR) 100 units/mL injection pen; Inject 80 Units under the skin daily at bedtime  -     insulin glulisine (APIDRA SOLOSTAR) 100 units/mL injection pen; Inject 30 Units under the skin 3 (three) times a day with meals  -     Ambulatory referral to Endocrinology; Future            Subjective:      Patient ID: Garland Moffett is a 54 y o  male  Patient is here today for follow-up of his diabetes  Patient had 3 episodes that started over the weekend where he had severe nausea and vomiting to the point of dry heaves  He was so weak he could not get out of bed  He had extreme dizziness at the same time  He did not check his blood sugars at that time because he states he was too weak  When he felt better he did take his blood sugar and the lowest reading was in the 170s  He had readings up into the 500s  He states most of his readings are in 200-300 range  He also states that his vision is more blurred than usual   I have advised the patient to have his cell phone near him at all time so that if he has another episode he can call an ambulance  Will try to get him in with the endocrinologist as soon as possible  I have adjusted his Lantus and Apidra  He is to follow up in 1 week  The following portions of the patient's history were reviewed and updated as appropriate:   He has no past medical history on file  ,   does not have any pertinent problems on file  ,   has a past surgical history that includes Foot surgery (Right)  ,  family history includes Cancer in his mother; Hypertension in his father  ,   reports that he has never smoked  He has never used smokeless tobacco  He reports that he drinks about 4 8 oz of alcohol per week   He reports that he does not use drugs  ,  has No Known Allergies     Current Outpatient Prescriptions   Medication Sig Dispense Refill    atorvastatin (LIPITOR) 20 mg tablet Take by mouth      AVALIDE 300-12 5 MG per tablet       BD PEN NEEDLE DEV U/F 32G X 4 MM MISC       Blood Glucose Monitoring Suppl (ONE TOUCH ULTRA 2) w/Device KIT by Does not apply route 2 (two) times a day 1 each 0    celecoxib (CeleBREX) 200 mg capsule Take 1 capsule (200 mg total) by mouth 2 (two) times a day 60 capsule 1    chlordiazePOXIDE (LIBRIUM) 10 mg capsule Take by mouth      citalopram (CeleXA) 20 mg tablet Take by mouth      diclofenac (VOLTAREN) 75 mg EC tablet Take 75 mg by mouth 2 (two) times a day with meals  0    doxepin (SINEquan) 100 mg capsule Take by mouth      glucose blood test strip 1 each by Other route as needed Use as instructed      insulin glargine (LANTUS SOLOSTAR) 100 units/mL injection pen Inject 80 Units under the skin daily at bedtime 5 pen 0    insulin glulisine (APIDRA SOLOSTAR) 100 units/mL injection pen Inject 30 Units under the skin 3 (three) times a day with meals 5 pen 0    LYRICA 50 MG capsule   0    omeprazole (PriLOSEC) 40 MG capsule       sildenafil (REVATIO) 20 mg tablet Take by mouth      verapamil (VERELAN PM) 360 MG 24 hr capsule        No current facility-administered medications for this visit  Review of Systems   Constitutional: Positive for activity change, appetite change and diaphoresis  Negative for fever  Respiratory: Negative for choking, chest tightness and shortness of breath  Cardiovascular: Negative for chest pain  Gastrointestinal: Positive for abdominal pain, diarrhea, nausea and vomiting  Endocrine: Negative  Skin: Negative  Neurological: Positive for dizziness, weakness and light-headedness  Objective:  Vitals:    11/06/18 1617   BP: 108/70   Pulse: (!) 109   Resp: 16   Temp: 98 3 °F (36 8 °C)   SpO2: 97%   Weight: 112 kg (246 lb)   Height: 6' 3" (1 905 m)     Body mass index is 30 75 kg/m²       Physical Exam   Constitutional: He is oriented to person, place, and time  He appears well-developed and well-nourished  HENT:   Head: Normocephalic  Eyes: Conjunctivae are normal    Cardiovascular: Normal rate  Pulmonary/Chest: Effort normal    Neurological: He is alert and oriented to person, place, and time  Skin: Skin is dry  Psychiatric: He has a normal mood and affect  His behavior is normal  Judgment and thought content normal    Nursing note and vitals reviewed  [FreeTextEntry1] : Chest x-ray PA lateral January 8, 2024 indication bronchitis Cardiac size normal Lung fields are grossly clear No appreciable parenchymal infiltrates pleural effusions dominant pulmonary nodules Soft tissue bony structures unremarkable NIOX 17 ppb WNL  1/8/24  PFT 1/8/24 flow  rates nl  lung volumes nl  DLCO 88 %  HGB 13.4 Normal study    Chest x-ray PA and lateral projection 2018 Indication common variable immunodeficiency syndrome newly diagnosed cystoscopy with history non-small cell lung cancer Chest x-ray demonstrates normal cardiac size no evidence of pleural effusions hilum is grossly normal. Rule out subtle right upper lobe faint opacity Impression rule out right upper lobe density  July 2018 Chest CT completed [facility]  2 mm right lower lobe nodule otherwise chest x-ray demonstrates clear lung  PFT demonstrates a very minimal except ventilatory impairment with no response to inhaled bronchodilator at the FEV1 Lung finds are normal Diffusion normal at 92% predicted

## 2024-02-05 ENCOUNTER — APPOINTMENT (OUTPATIENT)
Dept: GYNECOLOGIC ONCOLOGY | Facility: CLINIC | Age: 64
End: 2024-02-05
Payer: COMMERCIAL

## 2024-02-05 VITALS
DIASTOLIC BLOOD PRESSURE: 88 MMHG | HEIGHT: 63 IN | BODY MASS INDEX: 38.8 KG/M2 | WEIGHT: 219 LBS | SYSTOLIC BLOOD PRESSURE: 139 MMHG | HEART RATE: 76 BPM

## 2024-02-05 DIAGNOSIS — Z13.820 ENCOUNTER FOR SCREENING FOR OSTEOPOROSIS: ICD-10-CM

## 2024-02-05 DIAGNOSIS — C55 MALIGNANT NEOPLASM OF UTERUS, PART UNSPECIFIED: ICD-10-CM

## 2024-02-05 PROCEDURE — 99214 OFFICE O/P EST MOD 30 MIN: CPT

## 2024-02-05 RX ORDER — LANCETS 28 GAUGE
EACH MISCELLANEOUS
Qty: 1 | Refills: 1 | Status: DISCONTINUED | COMMUNITY
Start: 2020-03-11 | End: 2024-02-05

## 2024-02-05 RX ORDER — INSULIN LISPRO 100 [IU]/ML
100 INJECTION, SOLUTION INTRAVENOUS; SUBCUTANEOUS
Qty: 1 | Refills: 1 | Status: DISCONTINUED | COMMUNITY
Start: 2022-03-26 | End: 2024-02-05

## 2024-02-05 RX ORDER — BENZONATATE 100 MG/1
100 CAPSULE ORAL
Qty: 30 | Refills: 1 | Status: DISCONTINUED | COMMUNITY
Start: 2024-01-08 | End: 2024-02-05

## 2024-02-05 RX ORDER — PEN NEEDLE, DIABETIC 29 G X1/2"
32G X 4 MM NEEDLE, DISPOSABLE MISCELLANEOUS
Qty: 180 | Refills: 0 | Status: DISCONTINUED | COMMUNITY
Start: 2017-07-05 | End: 2024-02-05

## 2024-02-05 RX ORDER — DOXYCYCLINE HYCLATE 100 MG/1
100 CAPSULE ORAL
Qty: 14 | Refills: 0 | Status: DISCONTINUED | COMMUNITY
Start: 2024-01-08 | End: 2024-02-05

## 2024-02-05 RX ORDER — ISOPROPYL ALCOHOL 0.7 ML/ML
SWAB TOPICAL
Qty: 2 | Refills: 3 | Status: DISCONTINUED | COMMUNITY
Start: 2019-06-19 | End: 2024-02-05

## 2024-02-05 RX ORDER — BLOOD-GLUCOSE,RECEIVER,CONT
EACH MISCELLANEOUS
Qty: 1 | Refills: 0 | Status: DISCONTINUED | COMMUNITY
Start: 2021-06-28 | End: 2024-02-05

## 2024-02-05 RX ORDER — CEPHALEXIN 500 MG/1
500 TABLET ORAL
Qty: 28 | Refills: 0 | Status: DISCONTINUED | COMMUNITY
Start: 2021-10-25 | End: 2024-02-05

## 2024-02-05 RX ORDER — INSULIN GLARGINE 100 [IU]/ML
100 INJECTION, SOLUTION SUBCUTANEOUS
Qty: 4 | Refills: 1 | Status: DISCONTINUED | COMMUNITY
Start: 2020-01-22 | End: 2024-02-05

## 2024-02-05 RX ORDER — INSULIN GLARGINE 100 [IU]/ML
INJECTION, SOLUTION SUBCUTANEOUS
Refills: 0 | Status: DISCONTINUED | COMMUNITY
End: 2024-02-05

## 2024-02-05 RX ORDER — IBANDRONATE SODIUM 150 MG/1
150 TABLET ORAL
Qty: 3 | Refills: 3 | Status: DISCONTINUED | COMMUNITY
Start: 2019-09-10 | End: 2024-02-05

## 2024-02-05 RX ORDER — DULAGLUTIDE 3 MG/.5ML
3 INJECTION, SOLUTION SUBCUTANEOUS
Qty: 3 | Refills: 1 | Status: DISCONTINUED | COMMUNITY
Start: 2019-06-19 | End: 2024-02-05

## 2024-02-05 RX ORDER — EZETIMIBE 10 MG/1
10 TABLET ORAL
Qty: 90 | Refills: 1 | Status: DISCONTINUED | COMMUNITY
Start: 2021-05-24 | End: 2024-02-05

## 2024-02-05 RX ORDER — ATORVASTATIN CALCIUM 40 MG/1
40 TABLET, FILM COATED ORAL
Qty: 90 | Refills: 1 | Status: DISCONTINUED | COMMUNITY
Start: 2018-02-28 | End: 2024-02-05

## 2024-02-05 RX ORDER — BLOOD-GLUCOSE SENSOR
EACH MISCELLANEOUS
Qty: 3 | Refills: 3 | Status: DISCONTINUED | COMMUNITY
Start: 2021-06-28 | End: 2024-02-05

## 2024-02-05 RX ORDER — PEN NEEDLE, DIABETIC 29 G X1/2"
32G X 4 MM NEEDLE, DISPOSABLE MISCELLANEOUS
Qty: 5 | Refills: 3 | Status: DISCONTINUED | COMMUNITY
Start: 2019-06-19 | End: 2024-02-05

## 2024-02-05 RX ORDER — UBIDECARENONE 200 MG
CAPSULE ORAL
Refills: 0 | Status: DISCONTINUED | COMMUNITY
End: 2024-02-05

## 2024-02-05 RX ORDER — LANCETS 33 GAUGE
EACH MISCELLANEOUS
Qty: 300 | Refills: 0 | Status: DISCONTINUED | COMMUNITY
Start: 2017-05-24 | End: 2024-02-05

## 2024-02-05 RX ORDER — BLOOD-GLUCOSE TRANSMITTER
EACH MISCELLANEOUS
Qty: 1 | Refills: 3 | Status: DISCONTINUED | COMMUNITY
Start: 2021-06-28 | End: 2024-02-05

## 2024-02-05 RX ORDER — MULTIVITAMIN
TABLET ORAL
Refills: 0 | Status: DISCONTINUED | COMMUNITY
End: 2024-02-05

## 2024-02-05 RX ORDER — ESCITALOPRAM OXALATE 10 MG/1
10 TABLET, FILM COATED ORAL
Qty: 90 | Refills: 0 | Status: DISCONTINUED | COMMUNITY
Start: 2017-08-24 | End: 2024-02-05

## 2024-02-05 NOTE — DISCUSSION/SUMMARY
Pt called re: below  [Reviewed Clinical Lab Test(s)] : Results of clinical tests were reviewed. [Reviewed Radiology Report(s)] : Radiology reports were reviewed. [FreeTextEntry1] : We disc that she is MARTHA. -We reviewed her reassuring eval. -We disc  f/u with SG, and we reviewed kegels.   -I have advised GI f/u given her bowel habit fxn report.  -We disc her BHM with AK. -We disc bone health and an Rx provided for a f/u study. We also disc C and Vit D supplemenation, as wel  as ex as katty (and its varied benefits).  -Her instrx were reviewed. -All Q/A -RTO in 6m, sooner prn.

## 2024-02-05 NOTE — REVIEW OF SYSTEMS
[Negative] : Musculoskeletal [Incontinence] : incontinence [Diarrhea] : diarrhea [Diabetes] : diabetes mellitus [FreeTextEntry4] : See HPI [FreeTextEntry5] : AK follows counts per pt [de-identified] : See HPI

## 2024-02-05 NOTE — HISTORY OF PRESENT ILLNESS
[FreeTextEntry1] : GYN- Dr. Jones (recently retired) Former GYN Onc- Dr. Morin & Dr. Beltre PCP/Referring- Dr. PRISCILA Hernandez GI- Dr PHILIP Barlow (was Dr. Jere Cantu) Hepatologist- Dr. Neville Allergist- Dr. Liana Johns Immunologist- Dr. Chappell Endo- Dr. Shirley Rangel- Dr. Trust  HPI: She presents for follow up. No vulvar concerns. No VB or VD. She reports loose bowels persist. She reports less UI that she feels r/t improved DM control. Still sees SG she confirms. She conf AK attends to [facility]. Disc past DEXA.   Hx: The patient has a past GYN history significant for RSO in 1979 for right dermoid cyst and  stage 1 endometrial cancer s/p CHELSEA LSO, bilateral SNL dissection in 4/2016 with Dr. Morin.  Pathology consistent with FIGO grade 1 endometrioid adenocarcinoma, 1/15 mm myoinvasion, no LVSI, negative staging.  MMR IHC intact x 4.  No further treatment indicated.  Care transferred to Dr. Gloria Beltre in 5/2018.  The patient's general GYN, Dr. Jones, recently retired.  Last saw a GYN Oncologist in 2018.    Ms. Guzman has a pmhx significant for HTN, HLD, type 2 DM, obesity, tubular adenoma resection 2014, ocular pemphegoid s/p Rituxan and Decadron, CVID on monthly IVIG, NAFLD.   Health Maintenance: BHM by AK she again confirms.  DEXA - Mar 2022 -nl.

## 2024-02-05 NOTE — PHYSICAL EXAM
[Chaperone Present] : A chaperone was present in the examining room during all aspects of the physical examination [Abnormal] : Examination of extremities for edema and/or varicosities: Abnormal [Absent] : Adnexa(ae): Absent [Normal] : Recto-Vaginal Exam: Normal [Fully active, able to carry on all pre-disease performance without restriction] : Status 0 - Fully active, able to carry on all pre-disease performance without restriction [FreeTextEntry1] : DC [de-identified] : 2+ edema [de-identified] : Scars CDI [de-identified] : atrophy

## 2024-04-16 ENCOUNTER — APPOINTMENT (OUTPATIENT)
Dept: RADIOLOGY | Facility: CLINIC | Age: 64
End: 2024-04-16
Payer: COMMERCIAL

## 2024-04-16 ENCOUNTER — OUTPATIENT (OUTPATIENT)
Dept: OUTPATIENT SERVICES | Facility: HOSPITAL | Age: 64
LOS: 1 days | End: 2024-04-16
Payer: COMMERCIAL

## 2024-04-16 DIAGNOSIS — Z98.890 OTHER SPECIFIED POSTPROCEDURAL STATES: Chronic | ICD-10-CM

## 2024-04-16 DIAGNOSIS — Z98.89 OTHER SPECIFIED POSTPROCEDURAL STATES: Chronic | ICD-10-CM

## 2024-04-16 DIAGNOSIS — Z00.8 ENCOUNTER FOR OTHER GENERAL EXAMINATION: ICD-10-CM

## 2024-04-16 DIAGNOSIS — Z90.89 ACQUIRED ABSENCE OF OTHER ORGANS: Chronic | ICD-10-CM

## 2024-04-16 DIAGNOSIS — Z90.710 ACQUIRED ABSENCE OF BOTH CERVIX AND UTERUS: Chronic | ICD-10-CM

## 2024-04-16 PROCEDURE — 77080 DXA BONE DENSITY AXIAL: CPT | Mod: 26

## 2024-04-16 PROCEDURE — 77080 DXA BONE DENSITY AXIAL: CPT

## 2024-04-29 ENCOUNTER — APPOINTMENT (OUTPATIENT)
Dept: PULMONOLOGY | Facility: CLINIC | Age: 64
End: 2024-04-29

## 2024-08-22 ENCOUNTER — APPOINTMENT (OUTPATIENT)
Dept: ORTHOPEDIC SURGERY | Facility: CLINIC | Age: 64
End: 2024-08-22
Payer: COMMERCIAL

## 2024-08-22 VITALS
HEART RATE: 73 BPM | WEIGHT: 204 LBS | SYSTOLIC BLOOD PRESSURE: 147 MMHG | DIASTOLIC BLOOD PRESSURE: 82 MMHG | BODY MASS INDEX: 33.99 KG/M2 | HEIGHT: 65 IN

## 2024-08-22 DIAGNOSIS — M25.571 PAIN IN RIGHT ANKLE AND JOINTS OF RIGHT FOOT: ICD-10-CM

## 2024-08-22 DIAGNOSIS — M25.572 PAIN IN RIGHT ANKLE AND JOINTS OF RIGHT FOOT: ICD-10-CM

## 2024-08-22 DIAGNOSIS — S92.515A NONDISPLACED FRACTURE OF PROXIMAL PHALANX OF LEFT LESSER TOE(S), INITIAL ENCOUNTER FOR CLOSED FRACTURE: ICD-10-CM

## 2024-08-22 DIAGNOSIS — G89.29 PAIN IN RIGHT ANKLE AND JOINTS OF RIGHT FOOT: ICD-10-CM

## 2024-08-22 PROCEDURE — 73610 X-RAY EXAM OF ANKLE: CPT | Mod: 50

## 2024-08-22 PROCEDURE — 73630 X-RAY EXAM OF FOOT: CPT | Mod: LT

## 2024-08-22 PROCEDURE — 99203 OFFICE O/P NEW LOW 30 MIN: CPT

## 2024-08-22 NOTE — DISCUSSION/SUMMARY
[de-identified] : The patient has a fracture to the left second toe.  I have discussed the pathology, natural history and treatment options with her.  Treatment will be loy taping to the adjacent toe and wearing a comfortable shoe.  She should be reevaluated in 1 month. She reports chronic bilateral ankle pain.  Her exam demonstrates mild arthritis and flatfoot deformity.  I recommend use of shoes with supportive arch.  If this is not satisfactory she can be reviewed by the foot and ankle service.

## 2024-08-22 NOTE — PHYSICAL EXAM
[Slightly Antalgic] : slightly antalgic [LE] : Sensory: Intact in bilateral lower extremities [DP] : dorsalis pedis 2+ and symmetric bilaterally [PT] : posterior tibial 2+ and symmetric bilaterally [Normal] : Alert and in no acute distress [Poor Appearance] : well-appearing [Acute Distress] : not in acute distress [Obese] : not obese [de-identified] : The patient has no respiratory distress. Mood and affect are normal. The patient is alert and oriented to person, place and time. She has tenderness and swelling of the left second toe.  The skin is intact.  There is no additional tenderness.  Both Achilles tendons are intact.  Both ankles are stable.  She has ankle dorsiflexion of 10 and plantarflexion of 30 bilaterally.   [de-identified] : AP, lateral and oblique x-rays of the left foot demonstrate fracture of the second toe proximal phalanx at the PIP joint AP, lateral and mortise x-rays of both ankles demonstrate no fracture or dislocation.  There are mild degenerative changes.

## 2024-08-22 NOTE — HISTORY OF PRESENT ILLNESS
[de-identified] : 63-year-old female presents for evaluation of left foot injury that occurred on 8/7/24. She slipped and jammed her foot into a barstool. She saw a podiatrist on 8/16/24 and had x-rays was told she had a second toe fracture. She was given a Darco shoe. She complains of constant throbbing pain in the foot worse with prolonged walking and standing. She has tried ice with some relief. She also complaints of chronic bilateral ankle pain. Denies paresthesias. Denies prior injuries.

## 2024-09-10 ENCOUNTER — APPOINTMENT (OUTPATIENT)
Dept: ORTHOPEDIC SURGERY | Facility: CLINIC | Age: 64
End: 2024-09-10
Payer: COMMERCIAL

## 2024-09-10 VITALS
SYSTOLIC BLOOD PRESSURE: 115 MMHG | DIASTOLIC BLOOD PRESSURE: 78 MMHG | BODY MASS INDEX: 33.66 KG/M2 | HEART RATE: 85 BPM | WEIGHT: 202 LBS | HEIGHT: 65 IN

## 2024-09-10 DIAGNOSIS — Z86.39 PERSONAL HISTORY OF OTHER ENDOCRINE, NUTRITIONAL AND METABOLIC DISEASE: ICD-10-CM

## 2024-09-10 DIAGNOSIS — I99.8 OTHER DISORDER OF CIRCULATORY SYSTEM: ICD-10-CM

## 2024-09-10 DIAGNOSIS — S92.512D DISPLACED FRACTURE OF PROXIMAL PHALANX OF LEFT LESSER TOE(S), SUBSEQUENT ENCOUNTER FOR FRACTURE WITH ROUTINE HEALING: ICD-10-CM

## 2024-09-10 DIAGNOSIS — M21.42 FLAT FOOT [PES PLANUS] (ACQUIRED), LEFT FOOT: ICD-10-CM

## 2024-09-10 PROCEDURE — 99214 OFFICE O/P EST MOD 30 MIN: CPT

## 2024-09-10 PROCEDURE — 73630 X-RAY EXAM OF FOOT: CPT | Mod: LT

## 2024-09-15 ENCOUNTER — NON-APPOINTMENT (OUTPATIENT)
Age: 64
End: 2024-09-15

## 2024-09-15 PROBLEM — I99.8 VASCULAR CALCIFICATION: Status: ACTIVE | Noted: 2024-09-15

## 2024-09-15 PROBLEM — M21.42 ACQUIRED PES PLANUS OF LEFT FOOT: Status: ACTIVE | Noted: 2024-09-15

## 2024-09-15 NOTE — HISTORY OF PRESENT ILLNESS
[FreeTextEntry1] : 63 year old female presents today for eval of second toe s/p fx 8/7/24. Patient tripped in kitchen and stubbed her toe into a bar stool. She then followed up with podiatrist, x-ray revealed fx, given surgical shoe and did loy taping. She then followed up with Dr. Manuel who instructed patient to continue loy taping, and transition to comfortable shoe. She presents in slippers. The pain is constant but is not taking pain medication.  PMH includes DM (Hgb A1C 10.0 on 3/26/22, 11.2 on 6/9/21).

## 2024-09-15 NOTE — HISTORY OF PRESENT ILLNESS
[FreeTextEntry1] : 63 year old female presents today for eval of second toe s/p fx 8/7/24. Patient tripped in kitchen and stubbed her toe into a bar stool. She then followed up with podiatrist, x-ray revealed fx, given surgical shoe and did loy taping. She then followed up with Dr. Manuel who instructed patient to continue oly taping, and transition to comfortable shoe. She presents in slippers. The pain is constant but is not taking pain medication.  PMH includes DM (Hgb A1C 10.0 on 3/26/22, 11.2 on 6/9/21).

## 2024-09-15 NOTE — DISCUSSION/SUMMARY
[Surgical risks reviewed] : Surgical risks reviewed [de-identified] : Discussed with patient nature of condition, potential course / sequelae, options reviewed including possible surgical intervention PIP joint arthrodesis / pinning procedure deferred by patient at this juncture. Hoka shoe wear / RBS and wide toe box, gel toe cap, activity modification, HEP. Return to office in 4 - 6 weeks / PRN, all questions answered and patient aware necessity of diabetes management with lowering of Hgb A1C.

## 2024-09-15 NOTE — PHYSICAL EXAM
[de-identified] : Extremity: +equinus (releases) L LE, +PPAV L foot, residual soft tissue swelling and nontender second toe L forefoot, mild varus deformity PIP joint second toe L forefoot with associated stiffness. Nontender L ankle, peroneals, syndesmosis, Achilles, hindfoot ST, midfoot LF and PTT insertional, and forefoot. Calves soft and nontender, sensorimotor and skin as aforementioned L LE. AOx3, mood / affect normal. [de-identified] : Radiographs (8/22/24 supine) reveal PTS B feet, plantar calcaneal enthesophyte B hindfoot, medial condyle fracture displacement / mild varus PIP joint second toe L forefoot, Decker's B feet.  Radiographs (3v L foot) reveal PTS L foot, plantar calcaneal enthesophyte L hindfoot, healing fracture medial condyle distal aspect of PP / mild residual varus PIP joint second toe L forefoot, vascular calcifications L LE.

## 2024-09-15 NOTE — PHYSICAL EXAM
[de-identified] : Extremity: +equinus (releases) L LE, +PPAV L foot, residual soft tissue swelling and nontender second toe L forefoot, mild varus deformity PIP joint second toe L forefoot with associated stiffness. Nontender L ankle, peroneals, syndesmosis, Achilles, hindfoot ST, midfoot LF and PTT insertional, and forefoot. Calves soft and nontender, sensorimotor and skin as aforementioned L LE. AOx3, mood / affect normal. [de-identified] : Radiographs (8/22/24 supine) reveal PTS B feet, plantar calcaneal enthesophyte B hindfoot, medial condyle fracture displacement / mild varus PIP joint second toe L forefoot, Decker's B feet.  Radiographs (3v L foot) reveal PTS L foot, plantar calcaneal enthesophyte L hindfoot, healing fracture medial condyle distal aspect of PP / mild residual varus PIP joint second toe L forefoot, vascular calcifications L LE.

## 2024-09-15 NOTE — DISCUSSION/SUMMARY
[Surgical risks reviewed] : Surgical risks reviewed [de-identified] : Discussed with patient nature of condition, potential course / sequelae, options reviewed including possible surgical intervention PIP joint arthrodesis / pinning procedure deferred by patient at this juncture. Hoka shoe wear / RBS and wide toe box, gel toe cap, activity modification, HEP. Return to office in 4 - 6 weeks / PRN, all questions answered and patient aware necessity of diabetes management with lowering of Hgb A1C.

## 2024-09-15 NOTE — PHYSICAL EXAM
[de-identified] : Extremity: +equinus (releases) L LE, +PPAV L foot, residual soft tissue swelling and nontender second toe L forefoot, mild varus deformity PIP joint second toe L forefoot with associated stiffness. Nontender L ankle, peroneals, syndesmosis, Achilles, hindfoot ST, midfoot LF and PTT insertional, and forefoot. Calves soft and nontender, sensorimotor and skin as aforementioned L LE. AOx3, mood / affect normal. [de-identified] : Radiographs (8/22/24 supine) reveal PTS B feet, plantar calcaneal enthesophyte B hindfoot, medial condyle fracture displacement / mild varus PIP joint second toe L forefoot, Decker's B feet.  Radiographs (3v L foot) reveal PTS L foot, plantar calcaneal enthesophyte L hindfoot, healing fracture medial condyle distal aspect of PP / mild residual varus PIP joint second toe L forefoot, vascular calcifications L LE.

## 2024-09-15 NOTE — DISCUSSION/SUMMARY
[Surgical risks reviewed] : Surgical risks reviewed [de-identified] : Discussed with patient nature of condition, potential course / sequelae, options reviewed including possible surgical intervention PIP joint arthrodesis / pinning procedure deferred by patient at this juncture. Hoka shoe wear / RBS and wide toe box, gel toe cap, activity modification, HEP. Return to office in 4 - 6 weeks / PRN, all questions answered and patient aware necessity of diabetes management with lowering of Hgb A1C.

## 2024-09-15 NOTE — PHYSICAL EXAM
[de-identified] : Extremity: +equinus (releases) L LE, +PPAV L foot, residual soft tissue swelling and nontender second toe L forefoot, mild varus deformity PIP joint second toe L forefoot with associated stiffness. Nontender L ankle, peroneals, syndesmosis, Achilles, hindfoot ST, midfoot LF and PTT insertional, and forefoot. Calves soft and nontender, sensorimotor and skin as aforementioned L LE. AOx3, mood / affect normal. [de-identified] : Radiographs (8/22/24 supine) reveal PTS B feet, plantar calcaneal enthesophyte B hindfoot, medial condyle fracture displacement / mild varus PIP joint second toe L forefoot, Decker's B feet.  Radiographs (3v L foot) reveal PTS L foot, plantar calcaneal enthesophyte L hindfoot, healing fracture medial condyle distal aspect of PP / mild residual varus PIP joint second toe L forefoot, vascular calcifications L LE.

## 2024-09-15 NOTE — DISCUSSION/SUMMARY
[Surgical risks reviewed] : Surgical risks reviewed [de-identified] : Discussed with patient nature of condition, potential course / sequelae, options reviewed including possible surgical intervention PIP joint arthrodesis / pinning procedure deferred by patient at this juncture. Hoka shoe wear / RBS and wide toe box, gel toe cap, activity modification, HEP. Return to office in 4 - 6 weeks / PRN, all questions answered and patient aware necessity of diabetes management with lowering of Hgb A1C.

## 2024-09-16 ENCOUNTER — APPOINTMENT (OUTPATIENT)
Dept: GYNECOLOGIC ONCOLOGY | Facility: CLINIC | Age: 64
End: 2024-09-16
Payer: COMMERCIAL

## 2024-09-16 VITALS
OXYGEN SATURATION: 99 % | BODY MASS INDEX: 34.16 KG/M2 | TEMPERATURE: 97.8 F | WEIGHT: 205 LBS | HEIGHT: 65 IN | DIASTOLIC BLOOD PRESSURE: 83 MMHG | SYSTOLIC BLOOD PRESSURE: 125 MMHG | HEART RATE: 77 BPM

## 2024-09-16 DIAGNOSIS — M85.80 OTHER SPECIFIED DISORDERS OF BONE DENSITY AND STRUCTURE, UNSPECIFIED SITE: ICD-10-CM

## 2024-09-16 DIAGNOSIS — C55 MALIGNANT NEOPLASM OF UTERUS, PART UNSPECIFIED: ICD-10-CM

## 2024-09-16 DIAGNOSIS — N89.8 OTHER SPECIFIED NONINFLAMMATORY DISORDERS OF VAGINA: ICD-10-CM

## 2024-09-16 PROCEDURE — 99214 OFFICE O/P EST MOD 30 MIN: CPT

## 2024-09-16 PROCEDURE — 99459 PELVIC EXAMINATION: CPT

## 2024-09-16 NOTE — REVIEW OF SYSTEMS
[Diabetes] : diabetes mellitus [Incontinence] : incontinence [Diarrhea] : diarrhea [de-identified] : See HPI [Negative] : Gastrointestinal [FreeTextEntry4] : See HPI [FreeTextEntry5] : AK follows counts per pt [de-identified] :  see HPI

## 2024-09-16 NOTE — PHYSICAL EXAM
[Chaperone Present] : A chaperone was present in the examining room during all aspects of the physical examination [Abnormal] : Examination of extremities for edema and/or varicosities: Abnormal [Absent] : Adnexa(ae): Absent [Normal] : Recto-Vaginal Exam: Normal [Fully active, able to carry on all pre-disease performance without restriction] : Status 0 - Fully active, able to carry on all pre-disease performance without restriction [75904] : A chaperone was present during the pelvic exam. [FreeTextEntry2] : Freda [de-identified] : 1+ edema [de-identified] : Scars CDI [de-identified] : atrophy

## 2024-09-16 NOTE — HISTORY OF PRESENT ILLNESS
[FreeTextEntry1] : GYN- Dr. Jones (recently retired) Former GYN Onc- Dr. Morin & Dr. Beltre PCP/Referring- Dr. PRISCILA Hernandez GI- Dr PHILIP Barlow (was Dr. Jere Cantu) Hepatologist- Dr. Neville Allergist- Dr. Liana Johns Immunologist- Dr. Chappell Endo- Dr. Shirley Rangel- Dr. Trust  HPI: She presents for follow up. No vulvar concerns. No VB or VD. She reports no GI concerns but sees SG for rec UTIs. She notes she's been taking her Dad for  evals for bladder ca. She conf AK attends to [facility].  She notes a recent fall with left toe fractures; still has pain she notes. She req advice re vag lubrication due to dryness.   Hx: The patient has a past GYN history significant for RSO in 1979 for right dermoid cyst and  stage 1 endometrial cancer s/p CHELSEA LSO, bilateral SNL dissection in 4/2016 with Dr. Morin.  Pathology consistent with FIGO grade 1 endometrioid adenocarcinoma, 1/15 mm myoinvasion, no LVSI, negative staging.  MMR IHC intact x 4.  No further treatment indicated.  Care transferred to Dr. Gloria Beltre in 5/2018.  The patient's general GYN, Dr. Jones, recently retired.  Last saw a GYN Oncologist in 2018.    Ms. Guzman has a pmhx significant for HTN, HLD, type 2 DM, obesity, tubular adenoma resection 2014, ocular pemphegoid s/p Rituxan and Decadron, CVID on monthly IVIG, NAFLD.   Health Maintenance: BHM by AK she again confirms.  DEXA - April 2024 - osteopenia.

## 2024-09-16 NOTE — PHYSICAL EXAM
[Chaperone Present] : A chaperone was present in the examining room during all aspects of the physical examination [Abnormal] : Examination of extremities for edema and/or varicosities: Abnormal [Absent] : Adnexa(ae): Absent [Normal] : Recto-Vaginal Exam: Normal [Fully active, able to carry on all pre-disease performance without restriction] : Status 0 - Fully active, able to carry on all pre-disease performance without restriction [90450] : A chaperone was present during the pelvic exam. [FreeTextEntry2] : Freda [de-identified] : 1+ edema [de-identified] : Scars CDI [de-identified] : atrophy

## 2024-09-16 NOTE — DISCUSSION/SUMMARY
[Reviewed Clinical Lab Test(s)] : Results of clinical tests were reviewed. [Reviewed Radiology Report(s)] : Radiology reports were reviewed. [FreeTextEntry1] : We disc that she is MARTHA. -We reviewed her reassuring eval. -We disc  sx and rec f/u; I also advised her to disc her father's condition so SG has context.   -NO current GI issues per pt.  -She confirms BHM with AK, who she confirms is her PCP.  -We disc bone health and the recent DEXA findings. I disc my advice for optimizing Ca and Vit D, as well as exercise as katty. A copy wof the report provided, and I rec she also review with AK at nv.  -We disc lubricant for the reported vag dryness (eg K-Y prod line, Replens).  -Her instrx were reviewed. -All Q/A -RTO in 6m, sooner prn. -Effort for the visit includes the note prep, review of prior material, interview, exam, further documentation, and coordination of care.

## 2024-09-16 NOTE — HISTORY OF PRESENT ILLNESS
[FreeTextEntry1] : GYN- Dr. Jones (recently retired) Former GYN Onc- Dr. Morin & Dr. Beltre PCP/Referring- Dr. PRISCILA Hernandez GI- Dr PHILIP Barlow (was Dr. Jere Cantu) Hepatologist- Dr. Neville Allergist- Dr. Liana Johns Immunologist- Dr. Chappell Endo- Dr. Sihrley Rangel- Dr. Trust  HPI: She presents for follow up. No vulvar concerns. No VB or VD. She reports no GI concerns but sees SG for rec UTIs. She notes she's been taking her Dad for  evals for bladder ca. She conf AK attends to [employer] [facility].  She notes a recent fall with left toe fractures; still has pain she notes. She req advice re vag lubrication due to dryness.   Hx: The patient has a past GYN history significant for RSO in 1979 for right dermoid cyst and  stage 1 endometrial cancer s/p CHELSEA LSO, bilateral SNL dissection in 4/2016 with Dr. Morin.  Pathology consistent with FIGO grade 1 endometrioid adenocarcinoma, 1/15 mm myoinvasion, no LVSI, negative staging.  MMR IHC intact x 4.  No further treatment indicated.  Care transferred to Dr. Gloria Beltre in 5/2018.  The patient's general GYN, Dr. Jones, recently retired.  Last saw a GYN Oncologist in 2018.    Ms. Guzman has a pmhx significant for HTN, HLD, type 2 DM, obesity, tubular adenoma resection 2014, ocular pemphegoid s/p Rituxan and Decadron, CVID on monthly IVIG, NAFLD.   Health Maintenance: BHM by AK she again confirms.  DEXA - April 2024 - osteopenia.

## 2024-09-16 NOTE — REVIEW OF SYSTEMS
[Diabetes] : diabetes mellitus [Incontinence] : incontinence [Diarrhea] : diarrhea [de-identified] : See HPI [Negative] : Gastrointestinal [FreeTextEntry4] : See HPI [FreeTextEntry5] : AK follows counts per pt [de-identified] :  see HPI

## 2024-09-26 ENCOUNTER — APPOINTMENT (OUTPATIENT)
Dept: ORTHOPEDIC SURGERY | Facility: CLINIC | Age: 64
End: 2024-09-26

## 2024-10-28 ENCOUNTER — APPOINTMENT (OUTPATIENT)
Dept: ORTHOPEDIC SURGERY | Facility: CLINIC | Age: 64
End: 2024-10-28

## 2024-11-06 ENCOUNTER — OUTPATIENT (OUTPATIENT)
Dept: OUTPATIENT SERVICES | Facility: HOSPITAL | Age: 64
LOS: 1 days | End: 2024-11-06
Payer: COMMERCIAL

## 2024-11-06 ENCOUNTER — APPOINTMENT (OUTPATIENT)
Dept: ULTRASOUND IMAGING | Facility: IMAGING CENTER | Age: 64
End: 2024-11-06

## 2024-11-06 ENCOUNTER — APPOINTMENT (OUTPATIENT)
Dept: MAMMOGRAPHY | Facility: IMAGING CENTER | Age: 64
End: 2024-11-06

## 2024-11-06 DIAGNOSIS — Z98.89 OTHER SPECIFIED POSTPROCEDURAL STATES: Chronic | ICD-10-CM

## 2024-11-06 DIAGNOSIS — Z90.710 ACQUIRED ABSENCE OF BOTH CERVIX AND UTERUS: Chronic | ICD-10-CM

## 2024-11-06 DIAGNOSIS — Z90.89 ACQUIRED ABSENCE OF OTHER ORGANS: Chronic | ICD-10-CM

## 2024-11-06 DIAGNOSIS — Z00.8 ENCOUNTER FOR OTHER GENERAL EXAMINATION: ICD-10-CM

## 2024-11-06 DIAGNOSIS — Z98.890 OTHER SPECIFIED POSTPROCEDURAL STATES: Chronic | ICD-10-CM

## 2024-11-06 PROCEDURE — 77063 BREAST TOMOSYNTHESIS BI: CPT

## 2024-11-06 PROCEDURE — 77067 SCR MAMMO BI INCL CAD: CPT | Mod: 26

## 2024-11-06 PROCEDURE — 76641 ULTRASOUND BREAST COMPLETE: CPT | Mod: 26,50

## 2024-11-06 PROCEDURE — 77067 SCR MAMMO BI INCL CAD: CPT

## 2024-11-06 PROCEDURE — 77063 BREAST TOMOSYNTHESIS BI: CPT | Mod: 26

## 2024-11-06 PROCEDURE — 76641 ULTRASOUND BREAST COMPLETE: CPT

## 2024-12-18 ENCOUNTER — APPOINTMENT (OUTPATIENT)
Dept: OPHTHALMOLOGY | Facility: CLINIC | Age: 64
End: 2024-12-18

## 2025-02-12 NOTE — PRE PROCEDURE NOTE - PROCEDURE SERVICE
[facility]  [address]  [place] [address]  [phone]    Patient Name:  Emily Wallis  YOB: 1970      Office Visit 2/12/2025    CHIEF COMPLAINT:    Chief Complaint   Patient presents with    New Patient    Sleep Apnea       HISTORY OF PRESENT ILLNESS:      The patient presents in outpatient consultation at the request of Dr. Palomino for management of obstructive sleep apnea.     The diagnostic polysomnography was notable for an apnea hypopnea index of 1.3 including 1 obstructive apneas and 6 hypopneas.  Oxygen desaturations are low as 88% were noted with SpO2 less than 89% for a total of 0.2 minutes of the test.  Significant cardiac arrhythmias were not evident.  The patient was noted to have 18.6 limb movements with a limb movement arousal index being about 3.8.      She reports that her  is no longer sleeping in the same bed with her due to her snoring and disturbing his sleep.  States that he has also awoken her during the night and said that he thought she was dying because she was not breathing.  Reports that when she awakens she feels as if her heart is pounding in her chest.  She states that she is continually exhausted and never awakens in the morning feeling as if she has had a good night sleep.  Is sleepy throughout most days and usually takes a 1-2-hour nap.  States that after the nap she sometimes has good energy for the rest of the day.  Idamay score is 14/24.  Her normal bedtime hours are from 11 PM until 6 AM.  She reports that she can only sleep for about 2 hours before awakening and then has difficulty going back to sleep.  She has been told in the past that she grinds her teeth but has never worn a tooth guard.  She has also noticed that when she awakens she often has cramps in her legs and it takes a little while to position her legs for comfort so she can go back to sleep.  States she has a long family history of sleep apnea 
Endoscopy

## 2025-03-31 ENCOUNTER — APPOINTMENT (OUTPATIENT)
Dept: GYNECOLOGIC ONCOLOGY | Facility: CLINIC | Age: 65
End: 2025-03-31
Payer: COMMERCIAL

## 2025-03-31 ENCOUNTER — NON-APPOINTMENT (OUTPATIENT)
Age: 65
End: 2025-03-31

## 2025-03-31 VITALS
HEART RATE: 84 BPM | DIASTOLIC BLOOD PRESSURE: 92 MMHG | TEMPERATURE: 98 F | HEIGHT: 65 IN | OXYGEN SATURATION: 98 % | BODY MASS INDEX: 34.99 KG/M2 | SYSTOLIC BLOOD PRESSURE: 149 MMHG | WEIGHT: 210 LBS

## 2025-03-31 DIAGNOSIS — C55 MALIGNANT NEOPLASM OF UTERUS, PART UNSPECIFIED: ICD-10-CM

## 2025-03-31 DIAGNOSIS — L98.9 DISORDER OF THE SKIN AND SUBCUTANEOUS TISSUE, UNSPECIFIED: ICD-10-CM

## 2025-03-31 DIAGNOSIS — B00.9 HERPESVIRAL INFECTION, UNSPECIFIED: ICD-10-CM

## 2025-03-31 PROCEDURE — 99459 PELVIC EXAMINATION: CPT

## 2025-03-31 PROCEDURE — 99214 OFFICE O/P EST MOD 30 MIN: CPT

## 2025-04-30 ENCOUNTER — APPOINTMENT (OUTPATIENT)
Dept: OPHTHALMOLOGY | Facility: CLINIC | Age: 65
End: 2025-04-30